# Patient Record
Sex: FEMALE | Race: WHITE | NOT HISPANIC OR LATINO
[De-identification: names, ages, dates, MRNs, and addresses within clinical notes are randomized per-mention and may not be internally consistent; named-entity substitution may affect disease eponyms.]

---

## 2015-02-13 RX ORDER — FAMOTIDINE 10 MG/ML
1 INJECTION INTRAVENOUS
Qty: 0 | Refills: 0 | COMMUNITY
Start: 2015-02-13

## 2015-02-13 RX ORDER — METOPROLOL TARTRATE 50 MG
25 TABLET ORAL
Qty: 0 | Refills: 0 | COMMUNITY
Start: 2015-02-13

## 2017-03-20 ENCOUNTER — APPOINTMENT (OUTPATIENT)
Dept: ELECTROPHYSIOLOGY | Facility: CLINIC | Age: 82
End: 2017-03-20

## 2017-10-02 ENCOUNTER — APPOINTMENT (OUTPATIENT)
Dept: ELECTROPHYSIOLOGY | Facility: CLINIC | Age: 82
End: 2017-10-02
Payer: MEDICARE

## 2017-10-02 PROCEDURE — 93296 REM INTERROG EVL PM/IDS: CPT

## 2017-10-02 PROCEDURE — 93295 DEV INTERROG REMOTE 1/2/MLT: CPT

## 2017-11-11 ENCOUNTER — INPATIENT (INPATIENT)
Facility: HOSPITAL | Age: 82
LOS: 3 days | Discharge: HOME | End: 2017-11-15
Attending: INTERNAL MEDICINE | Admitting: INTERNAL MEDICINE
Payer: MEDICARE

## 2017-11-11 VITALS
HEART RATE: 76 BPM | DIASTOLIC BLOOD PRESSURE: 72 MMHG | SYSTOLIC BLOOD PRESSURE: 132 MMHG | OXYGEN SATURATION: 100 % | TEMPERATURE: 98 F | RESPIRATION RATE: 16 BRPM

## 2017-11-11 DIAGNOSIS — E11.9 TYPE 2 DIABETES MELLITUS WITHOUT COMPLICATIONS: ICD-10-CM

## 2017-11-11 DIAGNOSIS — N18.9 CHRONIC KIDNEY DISEASE, UNSPECIFIED: ICD-10-CM

## 2017-11-11 DIAGNOSIS — I21.4 NON-ST ELEVATION (NSTEMI) MYOCARDIAL INFARCTION: ICD-10-CM

## 2017-11-11 DIAGNOSIS — I10 ESSENTIAL (PRIMARY) HYPERTENSION: ICD-10-CM

## 2017-11-11 DIAGNOSIS — Z29.9 ENCOUNTER FOR PROPHYLACTIC MEASURES, UNSPECIFIED: ICD-10-CM

## 2017-11-11 DIAGNOSIS — I50.9 HEART FAILURE, UNSPECIFIED: ICD-10-CM

## 2017-11-11 DIAGNOSIS — J45.909 UNSPECIFIED ASTHMA, UNCOMPLICATED: ICD-10-CM

## 2017-11-11 DIAGNOSIS — I48.91 UNSPECIFIED ATRIAL FIBRILLATION: ICD-10-CM

## 2017-11-11 LAB
ALBUMIN SERPL ELPH-MCNC: 3.4 G/DL — SIGNIFICANT CHANGE UP (ref 3.3–5)
ALP SERPL-CCNC: 51 U/L — SIGNIFICANT CHANGE UP (ref 40–120)
ALT FLD-CCNC: 19 U/L — SIGNIFICANT CHANGE UP (ref 4–33)
ANISOCYTOSIS BLD QL: SIGNIFICANT CHANGE UP
APTT BLD: 31.8 SEC — SIGNIFICANT CHANGE UP (ref 27.5–37.4)
AST SERPL-CCNC: 29 U/L — SIGNIFICANT CHANGE UP (ref 4–32)
BASOPHILS # BLD AUTO: 0.06 K/UL — SIGNIFICANT CHANGE UP (ref 0–0.2)
BASOPHILS NFR BLD AUTO: 0.6 % — SIGNIFICANT CHANGE UP (ref 0–2)
BASOPHILS NFR SPEC: 0.9 % — SIGNIFICANT CHANGE UP (ref 0–2)
BILIRUB SERPL-MCNC: 0.5 MG/DL — SIGNIFICANT CHANGE UP (ref 0.2–1.2)
BUN SERPL-MCNC: 20 MG/DL — SIGNIFICANT CHANGE UP (ref 7–23)
CALCIUM SERPL-MCNC: 8.7 MG/DL — SIGNIFICANT CHANGE UP (ref 8.4–10.5)
CHLORIDE SERPL-SCNC: 107 MMOL/L — SIGNIFICANT CHANGE UP (ref 98–107)
CK MB BLD-MCNC: 3.2 — HIGH (ref 0–2.5)
CK MB BLD-MCNC: 5.64 NG/ML — HIGH (ref 1–4.7)
CK MB BLD-MCNC: 6.17 NG/ML — HIGH (ref 1–4.7)
CK SERPL-CCNC: 166 U/L — SIGNIFICANT CHANGE UP (ref 25–170)
CK SERPL-CCNC: 193 U/L — HIGH (ref 25–170)
CO2 SERPL-SCNC: 24 MMOL/L — SIGNIFICANT CHANGE UP (ref 22–31)
CREAT SERPL-MCNC: 1.85 MG/DL — HIGH (ref 0.5–1.3)
EOSINOPHIL # BLD AUTO: 0.05 K/UL — SIGNIFICANT CHANGE UP (ref 0–0.5)
EOSINOPHIL NFR BLD AUTO: 0.5 % — SIGNIFICANT CHANGE UP (ref 0–6)
EOSINOPHIL NFR FLD: 0 % — SIGNIFICANT CHANGE UP (ref 0–6)
GIANT PLATELETS BLD QL SMEAR: PRESENT — SIGNIFICANT CHANGE UP
GLUCOSE BLDC GLUCOMTR-MCNC: 86 MG/DL — SIGNIFICANT CHANGE UP (ref 70–99)
GLUCOSE SERPL-MCNC: 112 MG/DL — HIGH (ref 70–99)
HCT VFR BLD CALC: 30.8 % — LOW (ref 34.5–45)
HGB BLD-MCNC: 8.7 G/DL — LOW (ref 11.5–15.5)
HYPOCHROMIA BLD QL: SLIGHT — SIGNIFICANT CHANGE UP
IMM GRANULOCYTES # BLD AUTO: 0.07 # — SIGNIFICANT CHANGE UP
IMM GRANULOCYTES NFR BLD AUTO: 0.7 % — SIGNIFICANT CHANGE UP (ref 0–1.5)
INR BLD: 3.14 — HIGH (ref 0.88–1.17)
LYMPHOCYTES # BLD AUTO: 1.46 K/UL — SIGNIFICANT CHANGE UP (ref 1–3.3)
LYMPHOCYTES # BLD AUTO: 14.2 % — SIGNIFICANT CHANGE UP (ref 13–44)
LYMPHOCYTES NFR SPEC AUTO: 7.1 % — LOW (ref 13–44)
MCHC RBC-ENTMCNC: 22.2 PG — LOW (ref 27–34)
MCHC RBC-ENTMCNC: 28.2 % — LOW (ref 32–36)
MCV RBC AUTO: 78.6 FL — LOW (ref 80–100)
MICROCYTES BLD QL: SLIGHT — SIGNIFICANT CHANGE UP
MONOCYTES # BLD AUTO: 0.87 K/UL — SIGNIFICANT CHANGE UP (ref 0–0.9)
MONOCYTES NFR BLD AUTO: 8.5 % — SIGNIFICANT CHANGE UP (ref 2–14)
MONOCYTES NFR BLD: 2.6 % — SIGNIFICANT CHANGE UP (ref 2–9)
NEUTROPHIL AB SER-ACNC: 84.1 % — HIGH (ref 43–77)
NEUTROPHILS # BLD AUTO: 7.75 K/UL — HIGH (ref 1.8–7.4)
NEUTROPHILS NFR BLD AUTO: 75.5 % — SIGNIFICANT CHANGE UP (ref 43–77)
NEUTS BAND # BLD: 2.6 % — SIGNIFICANT CHANGE UP (ref 0–6)
NRBC # FLD: 0 — SIGNIFICANT CHANGE UP
OVALOCYTES BLD QL SMEAR: SLIGHT — SIGNIFICANT CHANGE UP
PLATELET # BLD AUTO: 197 K/UL — SIGNIFICANT CHANGE UP (ref 150–400)
PLATELET COUNT - ESTIMATE: NORMAL — SIGNIFICANT CHANGE UP
PMV BLD: SIGNIFICANT CHANGE UP FL (ref 7–13)
POIKILOCYTOSIS BLD QL AUTO: SLIGHT — SIGNIFICANT CHANGE UP
POTASSIUM SERPL-MCNC: 4.7 MMOL/L — SIGNIFICANT CHANGE UP (ref 3.5–5.3)
POTASSIUM SERPL-SCNC: 4.7 MMOL/L — SIGNIFICANT CHANGE UP (ref 3.5–5.3)
PROT SERPL-MCNC: 6.2 G/DL — SIGNIFICANT CHANGE UP (ref 6–8.3)
PROTHROM AB SERPL-ACNC: 36 SEC — HIGH (ref 9.8–13.1)
RBC # BLD: 3.92 M/UL — SIGNIFICANT CHANGE UP (ref 3.8–5.2)
RBC # FLD: 21.7 % — HIGH (ref 10.3–14.5)
SODIUM SERPL-SCNC: 142 MMOL/L — SIGNIFICANT CHANGE UP (ref 135–145)
TARGETS BLD QL SMEAR: SLIGHT — SIGNIFICANT CHANGE UP
TROPONIN T SERPL-MCNC: 0.5 NG/ML — HIGH (ref 0–0.06)
TROPONIN T SERPL-MCNC: 0.55 NG/ML — HIGH (ref 0–0.06)
VARIANT LYMPHS # BLD: 2.7 % — SIGNIFICANT CHANGE UP
WBC # BLD: 10.26 K/UL — SIGNIFICANT CHANGE UP (ref 3.8–10.5)
WBC # FLD AUTO: 10.26 K/UL — SIGNIFICANT CHANGE UP (ref 3.8–10.5)

## 2017-11-11 PROCEDURE — 71010: CPT | Mod: 26

## 2017-11-11 PROCEDURE — 74174 CTA ABD&PLVS W/CONTRAST: CPT | Mod: 26

## 2017-11-11 PROCEDURE — 71275 CT ANGIOGRAPHY CHEST: CPT | Mod: 26

## 2017-11-11 RX ORDER — INSULIN LISPRO 100/ML
VIAL (ML) SUBCUTANEOUS AT BEDTIME
Qty: 0 | Refills: 0 | Status: DISCONTINUED | OUTPATIENT
Start: 2017-11-11 | End: 2017-11-15

## 2017-11-11 RX ORDER — ASPIRIN/CALCIUM CARB/MAGNESIUM 324 MG
1 TABLET ORAL
Qty: 0 | Refills: 0 | COMMUNITY

## 2017-11-11 RX ORDER — MORPHINE SULFATE 50 MG/1
4 CAPSULE, EXTENDED RELEASE ORAL ONCE
Qty: 0 | Refills: 0 | Status: DISCONTINUED | OUTPATIENT
Start: 2017-11-11 | End: 2017-11-11

## 2017-11-11 RX ORDER — GLUCAGON INJECTION, SOLUTION 0.5 MG/.1ML
1 INJECTION, SOLUTION SUBCUTANEOUS ONCE
Qty: 0 | Refills: 0 | Status: DISCONTINUED | OUTPATIENT
Start: 2017-11-11 | End: 2017-11-15

## 2017-11-11 RX ORDER — BRIMONIDINE TARTRATE 2 MG/MG
1 SOLUTION/ DROPS OPHTHALMIC DAILY
Qty: 0 | Refills: 0 | Status: DISCONTINUED | OUTPATIENT
Start: 2017-11-11 | End: 2017-11-15

## 2017-11-11 RX ORDER — DEXTROSE 50 % IN WATER 50 %
12.5 SYRINGE (ML) INTRAVENOUS ONCE
Qty: 0 | Refills: 0 | Status: DISCONTINUED | OUTPATIENT
Start: 2017-11-11 | End: 2017-11-15

## 2017-11-11 RX ORDER — DEXTROSE 50 % IN WATER 50 %
1 SYRINGE (ML) INTRAVENOUS ONCE
Qty: 0 | Refills: 0 | Status: DISCONTINUED | OUTPATIENT
Start: 2017-11-11 | End: 2017-11-15

## 2017-11-11 RX ORDER — DEXTROSE 50 % IN WATER 50 %
25 SYRINGE (ML) INTRAVENOUS ONCE
Qty: 0 | Refills: 0 | Status: DISCONTINUED | OUTPATIENT
Start: 2017-11-11 | End: 2017-11-15

## 2017-11-11 RX ORDER — ALBUTEROL 90 UG/1
1 AEROSOL, METERED ORAL
Qty: 0 | Refills: 0 | Status: DISCONTINUED | OUTPATIENT
Start: 2017-11-11 | End: 2017-11-15

## 2017-11-11 RX ORDER — INSULIN LISPRO 100/ML
VIAL (ML) SUBCUTANEOUS
Qty: 0 | Refills: 0 | Status: DISCONTINUED | OUTPATIENT
Start: 2017-11-11 | End: 2017-11-15

## 2017-11-11 RX ORDER — ASPIRIN/CALCIUM CARB/MAGNESIUM 324 MG
81 TABLET ORAL DAILY
Qty: 0 | Refills: 0 | Status: DISCONTINUED | OUTPATIENT
Start: 2017-11-11 | End: 2017-11-15

## 2017-11-11 RX ORDER — ASPIRIN/CALCIUM CARB/MAGNESIUM 324 MG
162 TABLET ORAL ONCE
Qty: 0 | Refills: 0 | Status: COMPLETED | OUTPATIENT
Start: 2017-11-11 | End: 2017-11-11

## 2017-11-11 RX ORDER — NITROGLYCERIN 6.5 MG
0.4 CAPSULE, EXTENDED RELEASE ORAL ONCE
Qty: 0 | Refills: 0 | Status: DISCONTINUED | OUTPATIENT
Start: 2017-11-11 | End: 2017-11-11

## 2017-11-11 RX ORDER — HEPARIN SODIUM 5000 [USP'U]/ML
INJECTION INTRAVENOUS; SUBCUTANEOUS
Qty: 25000 | Refills: 0 | Status: DISCONTINUED | OUTPATIENT
Start: 2017-11-11 | End: 2017-11-11

## 2017-11-11 RX ORDER — SODIUM CHLORIDE 9 MG/ML
1000 INJECTION, SOLUTION INTRAVENOUS
Qty: 0 | Refills: 0 | Status: DISCONTINUED | OUTPATIENT
Start: 2017-11-11 | End: 2017-11-15

## 2017-11-11 RX ORDER — FLUTICASONE PROPIONATE 220 MCG
1 AEROSOL WITH ADAPTER (GRAM) INHALATION
Qty: 0 | Refills: 0 | Status: DISCONTINUED | OUTPATIENT
Start: 2017-11-11 | End: 2017-11-15

## 2017-11-11 RX ORDER — HEPARIN SODIUM 5000 [USP'U]/ML
3500 INJECTION INTRAVENOUS; SUBCUTANEOUS EVERY 6 HOURS
Qty: 0 | Refills: 0 | Status: DISCONTINUED | OUTPATIENT
Start: 2017-11-11 | End: 2017-11-11

## 2017-11-11 RX ORDER — FAMOTIDINE 10 MG/ML
20 INJECTION INTRAVENOUS DAILY
Qty: 0 | Refills: 0 | Status: DISCONTINUED | OUTPATIENT
Start: 2017-11-11 | End: 2017-11-15

## 2017-11-11 RX ORDER — ROSUVASTATIN CALCIUM 5 MG/1
40 TABLET ORAL AT BEDTIME
Qty: 0 | Refills: 0 | Status: DISCONTINUED | OUTPATIENT
Start: 2017-11-11 | End: 2017-11-15

## 2017-11-11 RX ORDER — NITROGLYCERIN 6.5 MG
0.4 CAPSULE, EXTENDED RELEASE ORAL ONCE
Qty: 0 | Refills: 0 | Status: COMPLETED | OUTPATIENT
Start: 2017-11-11 | End: 2017-11-11

## 2017-11-11 RX ORDER — BRIMONIDINE TARTRATE 2 MG/MG
1 SOLUTION/ DROPS OPHTHALMIC
Qty: 0 | Refills: 0 | COMMUNITY

## 2017-11-11 RX ORDER — INFLUENZA VIRUS VACCINE 15; 15; 15; 15 UG/.5ML; UG/.5ML; UG/.5ML; UG/.5ML
0.5 SUSPENSION INTRAMUSCULAR ONCE
Qty: 0 | Refills: 0 | Status: COMPLETED | OUTPATIENT
Start: 2017-11-11 | End: 2017-11-15

## 2017-11-11 RX ORDER — FLUTICASONE PROPIONATE 220 MCG
2 AEROSOL WITH ADAPTER (GRAM) INHALATION
Qty: 0 | Refills: 0 | COMMUNITY

## 2017-11-11 RX ORDER — HEPARIN SODIUM 5000 [USP'U]/ML
3500 INJECTION INTRAVENOUS; SUBCUTANEOUS ONCE
Qty: 0 | Refills: 0 | Status: COMPLETED | OUTPATIENT
Start: 2017-11-11 | End: 2017-11-11

## 2017-11-11 RX ORDER — FUROSEMIDE 40 MG
1 TABLET ORAL
Qty: 0 | Refills: 0 | COMMUNITY

## 2017-11-11 RX ORDER — LATANOPROST 0.05 MG/ML
1 SOLUTION/ DROPS OPHTHALMIC; TOPICAL AT BEDTIME
Qty: 0 | Refills: 0 | Status: DISCONTINUED | OUTPATIENT
Start: 2017-11-11 | End: 2017-11-15

## 2017-11-11 RX ORDER — METOPROLOL TARTRATE 50 MG
25 TABLET ORAL DAILY
Qty: 0 | Refills: 0 | Status: DISCONTINUED | OUTPATIENT
Start: 2017-11-11 | End: 2017-11-15

## 2017-11-11 RX ORDER — FUROSEMIDE 40 MG
20 TABLET ORAL DAILY
Qty: 0 | Refills: 0 | Status: DISCONTINUED | OUTPATIENT
Start: 2017-11-11 | End: 2017-11-13

## 2017-11-11 RX ORDER — MEXILETINE HYDROCHLORIDE 150 MG/1
150 CAPSULE ORAL
Qty: 0 | Refills: 0 | Status: DISCONTINUED | OUTPATIENT
Start: 2017-11-11 | End: 2017-11-15

## 2017-11-11 RX ADMIN — LATANOPROST 1 DROP(S): 0.05 SOLUTION/ DROPS OPHTHALMIC; TOPICAL at 23:24

## 2017-11-11 RX ADMIN — Medication 162 MILLIGRAM(S): at 18:24

## 2017-11-11 RX ADMIN — MORPHINE SULFATE 4 MILLIGRAM(S): 50 CAPSULE, EXTENDED RELEASE ORAL at 16:05

## 2017-11-11 RX ADMIN — HEPARIN SODIUM 3500 UNIT(S): 5000 INJECTION INTRAVENOUS; SUBCUTANEOUS at 18:32

## 2017-11-11 RX ADMIN — Medication 1 PUFF(S): at 22:34

## 2017-11-11 RX ADMIN — Medication 0.4 MILLIGRAM(S): at 20:14

## 2017-11-11 RX ADMIN — MORPHINE SULFATE 4 MILLIGRAM(S): 50 CAPSULE, EXTENDED RELEASE ORAL at 18:23

## 2017-11-11 RX ADMIN — HEPARIN SODIUM 700 UNIT(S)/HR: 5000 INJECTION INTRAVENOUS; SUBCUTANEOUS at 18:36

## 2017-11-11 RX ADMIN — ROSUVASTATIN CALCIUM 40 MILLIGRAM(S): 5 TABLET ORAL at 23:24

## 2017-11-11 NOTE — H&P ADULT - ASSESSMENT
85 y/o F with h/o a. fib on coumadin, anemia, asthma, bilateral cataracts, chronic glaucoma, PUD, CHF (EF 25%) s/p AICD, CAD, DM, DVT, HLD HTN, MI presents to the ED for chest pain. Admit to telemetry.

## 2017-11-11 NOTE — CONSULT NOTE ADULT - SUBJECTIVE AND OBJECTIVE BOX
HISTORY OF PRESENT ILLNESS:  Patient is a 86y old  Female who presents with a chief complaint of   HPI:      Allergies    adenosine (Other)  amiodarone (Unknown)  atenolol (Unknown)  digitalis glycosides (Nausea; Vomiting)  Lipitor (Other)    Intolerances    	    MEDICATIONS:  aspirin  chewable 81 milliGRAM(s) Oral daily  furosemide    Tablet 20 milliGRAM(s) Oral daily  metoprolol succinate ER 25 milliGRAM(s) Oral daily  mexiletine 150 milliGRAM(s) Oral two times a day      ALBUTerol    90 MICROgram(s) HFA Inhaler 1 Puff(s) Inhalation two times a day PRN  fluticasone propionate   110 MICROgram(s) HFA Inhaler 1 Puff(s) Inhalation two times a day      famotidine    Tablet 20 milliGRAM(s) Oral daily    rosuvastatin 40 milliGRAM(s) Oral at bedtime    brimonidine 0.2% Ophthalmic Solution 1 Drop(s) Both EYES daily  latanoprost 0.005% Ophthalmic Solution 1 Drop(s) Both EYES at bedtime      PAST MEDICAL & SURGICAL HISTORY:  Diabetes mellitus  Anemia  Asthma  Old MI (myocardial infarction): IN 1999  ICD: [employer]  DVT (Deep Venous Thrombosis) (ICD9 453.40)  AF (Atrial Fibrillation) (ICD9 427.31): on coumadin  Hyperlipemia (ICD9 272.4)  Chronic Glaucoma (ICD9 365.11)  Congestive Heart Failure (ICD9 428.0)  Bilateral Cataracts (ICD9 366.9)  Chronic Peptic Ulcer without Complication (ICD9 533.70)  Hypertension (ICD9 401.9)  Ventricular Tachycardia (ICD9 427.1)  Osteoporosis (ICD9 733.00)  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)  Coronary Artery Disease (ICD9 414.00)  H/O: hysterectomy  ICD: Medtronic 1999, 2006, 2009  S/P Ablation Operation for Arrhythmia (ICD9 V45.89): Vt ablation  s/p Ablation of Ventricular Arrhythmia (ICD9 V45.89): AVNRT ablation        FAMILY HISTORY:  No significant family history      SOCIAL HISTORY:      Smoker: [ ] Active [ ] never  [ ] previous  Alcohol:  [ ] social [ ] daily [ ] never  Lives with:   Occupation:    REVIEW OF SYSTEMS:  CONSTITUTIONAL: No weakness, fevers or chills  EYES/ENT: No visual changes;  No vertigo or throat pain   NECK: No pain or stiffness  RESPIRATORY: No cough, wheezing, hemoptysis; No shortness of breath  CARDIOVASCULAR: No chest pain or palpitations  GASTROINTESTINAL: No abdominal or epigastric pain. No nausea, vomiting, or hematemesis; No diarrhea or constipation. No melena or hematochezia.  GENITOURINARY: No dysuria, frequency or hematuria  NEUROLOGICAL: No numbness or weakness  SKIN: No itching, burning, rashes, or lesions   All other review of systems is negative unless indicated above.    PHYSICAL EXAM:  T(C): 36.9 (11-11-17 @ 14:51), Max: 36.9 (11-11-17 @ 14:51)  HR: 44 (11-11-17 @ 20:46) (44 - 76)  BP: 121/62 (11-11-17 @ 20:46) (116/62 - 136/63)  RR: 16 (11-11-17 @ 20:46) (16 - 19)  SpO2: 98% (11-11-17 @ 20:46) (98% - 100%)  Wt(kg): --    Appearance: Normal	  HEENT:   Normal oral mucosa, PERRL, EOMI	  Lymphatic: No lymphadenopathy  Cardiovascular: Normal S1 S2, No JVD, No murmurs, No edema  Respiratory: Lungs clear to auscultation	  Psychiatry: A & O x 3, Mood & affect appropriate  Gastrointestinal:  Soft, Non-tender, + BS	  Skin: No rashes, No ecchymoses, No cyanosis	  Neurologic: Non-focal, A&Ox3, nonfocal, MEMBRENO x 4  Extremities: Normal range of motion, No clubbing, cyanosis or edema  Vascular: Peripheral pulses palpable 2+ bilaterally    I&O's Summary    	 	  LABS:	 	                          8.7    10.26 )-----------( 197      ( 11 Nov 2017 15:23 )             30.8     11-11    142  |  107  |  20  ----------------------------<  112<H>  4.7   |  24  |  1.85<H>    Ca    8.7      11 Nov 2017 15:23    TPro  6.2  /  Alb  3.4  /  TBili  0.5  /  DBili  x   /  AST  29  /  ALT  19  /  AlkPhos  51  11-11    LIVER FUNCTIONS - ( 11 Nov 2017 15:23 )  Alb: 3.4 g/dL / Pro: 6.2 g/dL / ALK PHOS: 51 u/L / ALT: 19 u/L / AST: 29 u/L / GGT: x             proBNP:   Lipid Profile:   HgA1c:   TSH:   PT/INR - ( 11 Nov 2017 15:23 )   PT: 36.0 SEC;   INR: 3.14          PTT - ( 11 Nov 2017 15:23 )  PTT:31.8 SEC  CARDIAC MARKERS:  Troponin T, Serum: 0.55 ng/mL (11-11 @ 15:23)    Creatine Kinase, Serum: 193 u/L (11-11 @ 15:23)    CKMB: 6.17 ng/mL (11-11 @ 15:23)    CKMB Relative Index: 3.2 (11-11 @ 15:23)    TELEMETRY: 	    ECG:  	  RADIOLOGY:  OTHER: 	    PREVIOUS DIAGNOSTIC TESTING:    [ ] Echocardiogram:  [ ]  Catheterization:  [ ] Stress Test:  	  	    Assessment    Problem/Plan HISTORY OF PRESENT ILLNESS:  Patient is a 86y old  Female who presents with a chief complaint of CP  HPI: 87yo Female PMH of A-fib on coumadin, anemia, asthma, CAD, MI, HFrEF 25%, s/p BiVICD, CKD, DM, DVT, HLD, HTN, bilateral cataracts,  chronic glaucoma, PUD  p/w chest pain sudden onset this AM, sharp, midsternal, radiating to back. Reports CP began at rest. Not associated with SOB nausea, vomiting or diaphoresis. In ED EKG V-paced without ST elevations or depressions. CKMB index 3.2 and Troponin 0.55. Given SL nitro with decrease in CP. Called for urgent cath consult by Pt's cardiologist Dr Mac. Denies palpitations, syncope, near syncope, numbness, tingling, or weakness.    Allergies    adenosine (Other)  amiodarone (Unknown)  atenolol (Unknown)  digitalis glycosides (Nausea; Vomiting)  Lipitor (Other)    MEDICATIONS:  aspirin  chewable 81 milliGRAM(s) Oral daily  furosemide    Tablet 20 milliGRAM(s) Oral daily  metoprolol succinate ER 25 milliGRAM(s) Oral daily  mexiletine 150 milliGRAM(s) Oral two times a day  ALBUTerol    90 MICROgram(s) HFA Inhaler 1 Puff(s) Inhalation two times a day PRN  fluticasone propionate   110 MICROgram(s) HFA Inhaler 1 Puff(s) Inhalation two times a day  famotidine    Tablet 20 milliGRAM(s) Oral daily  rosuvastatin 40 milliGRAM(s) Oral at bedtime  brimonidine 0.2% Ophthalmic Solution 1 Drop(s) Both EYES daily  latanoprost 0.005% Ophthalmic Solution 1 Drop(s) Both EYES at bedtime      PAST MEDICAL & SURGICAL HISTORY:  Diabetes mellitus  Anemia  Asthma  Old MI (myocardial infarction): IN 1999  ICD: [employer]  DVT (Deep Venous Thrombosis) (ICD9 453.40)  AF (Atrial Fibrillation) (ICD9 427.31): on coumadin  Hyperlipemia (ICD9 272.4)  Chronic Glaucoma (ICD9 365.11)  Congestive Heart Failure (ICD9 428.0)  Bilateral Cataracts (ICD9 366.9)  Chronic Peptic Ulcer without Complication (ICD9 533.70)  Hypertension (ICD9 401.9)  Ventricular Tachycardia (ICD9 427.1)  Osteoporosis (ICD9 733.00)  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)  Coronary Artery Disease (ICD9 414.00)  H/O: hysterectomy  ICD: Medtronic 1999, 2006, 2009  S/P Ablation Operation for Arrhythmia (ICD9 V45.89): Vt ablation  s/p Ablation of Ventricular Arrhythmia (ICD9 V45.89): AVNRT ablation    FAMILY HISTORY:  No significant family history    SOCIAL HISTORY:      Smoker: [ ] Active [x] never  [ ] previous  Alcohol:  [ ] social [x ] daily [ ] never    REVIEW OF SYSTEMS:  CONSTITUTIONAL: No weakness, fevers or chills  EYES/ENT: No visual changes;  No vertigo or throat pain   NECK: No pain or stiffness  RESPIRATORY: No cough, wheezing, hemoptysis; No shortness of breath  CARDIOVASCULAR: Chest pain as per HPI, No palpitations  GASTROINTESTINAL: No abdominal or epigastric pain. No nausea, vomiting, or hematemesis; No diarrhea or constipation. No melena or hematochezia.  GENITOURINARY: No dysuria, frequency or hematuria  NEUROLOGICAL: No numbness or weakness  SKIN: No itching, burning, rashes, or lesions   All other review of systems is negative unless indicated above.    PHYSICAL EXAM:  T(C): 36.9 (11-11-17 @ 14:51), Max: 36.9 (11-11-17 @ 14:51)  HR: 44 (11-11-17 @ 20:46) (44 - 76)  BP: 121/62 (11-11-17 @ 20:46) (116/62 - 136/63)  RR: 16 (11-11-17 @ 20:46) (16 - 19)  SpO2: 98% (11-11-17 @ 20:46) (98% - 100%)  Wt(kg): --    Appearance: Normal	  HEENT:   Normal oral mucosa, PERRL, EOMI	  Lymphatic: No lymphadenopathy  Cardiovascular: Normal S1 S2, No JVD, No murmurs, No edema  Respiratory: Lungs clear to auscultation	  Psychiatry: A & O x 3, Mood & affect appropriate  Gastrointestinal:  Soft, Non-tender, + BS	  Skin: No rashes, No ecchymoses, No cyanosis	  Neurologic: Non-focal, A&Ox3, nonfocal, MEMBRENO x 4  Extremities: Normal range of motion, No clubbing, cyanosis or edema  Vascular: Peripheral pulses palpable 2+ bilaterally    I&O's Summary    	 	  LABS:	 	                          8.7    10.26 )-----------( 197      ( 11 Nov 2017 15:23 )             30.8     11-11    142  |  107  |  20  ----------------------------<  112<H>  4.7   |  24  |  1.85<H>    Ca    8.7      11 Nov 2017 15:23    TPro  6.2  /  Alb  3.4  /  TBili  0.5  /  DBili  x   /  AST  29  /  ALT  19  /  AlkPhos  51  11-11    LIVER FUNCTIONS - ( 11 Nov 2017 15:23 )  Alb: 3.4 g/dL / Pro: 6.2 g/dL / ALK PHOS: 51 u/L / ALT: 19 u/L / AST: 29 u/L / GGT: x             proBNP:   Lipid Profile:   HgA1c:   TSH:   PT/INR - ( 11 Nov 2017 15:23 )   PT: 36.0 SEC;   INR: 3.14          PTT - ( 11 Nov 2017 15:23 )  PTT:31.8 SEC  CARDIAC MARKERS:  Troponin T, Serum: 0.55 ng/mL (11-11 @ 15:23)    Creatine Kinase, Serum: 193 u/L (11-11 @ 15:23)    CKMB: 6.17 ng/mL (11-11 @ 15:23)    CKMB Relative Index: 3.2 (11-11 @ 15:23)    	    ECG: V paced	  RADIOLOGY: < from: CT Angio Chest w/ IV Cont (11.11.17 @ 17:40) >  IMPRESSION:     No aortic dissection, aortic aneurysm, or intramural hematoma.    Reflux of contrast into the suprahepatic IVC and hepatic veins may   reflect right heart failure.    Age-indeterminate T9 and T10 compression fractures are likely   acute/subacute, new since previous CT thoracic spine of 12/29/2015.   Multiple additional chronic compression fractures throughout the thoracic   spine the setting of osteopenia, unchanged.    Moderate hiatal hernia unchanged.      < end of copied text >    	    PREVIOUS DIAGNOSTIC TESTING:    [ ] Echocardiogram: V< from: Transesophageal Echocardiogram (02.12.15 @ 12:27) >  CONCLUSIONS:  1. Mitral annular calcification. Tethered mitral valve  leaflets with normal opening. Mild mitral regurgitation.  2. Calcified trileaflet aortic valve with normal opening.  Mild aortic regurgitation.  3. Severe left atrial enlargement. Decreased MIRTHA function  (velocity 30cm/s). No left atrial or left atrial appendage  thrombus.  4. Severe segmental left ventricular systolic dysfunction.  EF 20-25%. The inferior, infero-lateral and mid to distal  brittnee-lateral walls are akinetic. The rest of the walls  are hypokinetic.  5. Normal right ventricular size and function. A device  wire is noted in the right heart.  6. Estimated right ventricular systolic pressure equals 32  mm Hg, assuming right atrial pressure equals 10 mm Hg,  consistent with normal pulmonary pressures.  7. Normal pericardium with no pericardial effusion.  ------------------------------------------------------------------------    < end of copied text >    [ ]  Catheterization:< from: Cardiac Cath Lab (12.03.13 @ 13:40) >  VENTRICLES: Analysis of regional contractile function demonstrated mild  anterobasal hypokinesis, severe anterolateral hypokinesis, apical  akinesis, diaphragmatic akinesis, and posterobasal akinesis. EF estimated  was 15 %.  CORONARY VESSELS: The coronary circulation is right dominant.  LM:   --  LM: Angiography showed minor luminal irregularities with no flow  limiting lesions.  --  Ostial LM: The vessel was calcified. Angiography showed minor luminal  irregularities with no flow limiting lesions.  LAD:   --  LAD: Angiography showed minor luminal irregularities with no  flow limiting lesions.  CX:   --  Circumflex: Angiography showed minor luminal irregularities with  no flow limiting lesions.  RCA:   --  Ostial RCA: The distal vessel was supplied by extensive  collaterals from the distal LAD. There was a 100 % stenosis.  COMPLICATIONS: There were no complications.  DIAGNOSTIC IMPRESSIONS: Patient has a dilated LV with severe LV dysfunction  with total occlusion of RCA with mature collaterals with calcification of  aorta with nonobstrictive disease of the left system  DIAGNOSTIC RECOMMENDATIONS: Medical therapy for LV dysfunction.    < end of copied text >    Assessment  87yo Female PMH of A-fib on coumadin, anemia, asthma, CAD, MI, HFrEF 25%, s/p BiVICD, CKD, DM, DVT, HLD, HTN, bilateral cataracts,  chronic glaucoma, PUD  p/w chest pain sudden onset this AM, sharp, midsternal, radiating to back. Now with elevated troponins in the setting of CKD. EKG  V-paced without ST elevations or depressions. CP responding to SL nitro. INR supra therapeutic 3.14       Problem/Plan: NSTEMI  JEREMI score: 5  Patient with elevated CE in the setting of CKD and chest pain without EKG changes now responding to nitroglycerin.   Pt currently with supra therapeutic INR and at risk for bleeding. No need for urgent cath at this time. Will reevaluate as necessary. HISTORY OF PRESENT ILLNESS:  Patient is a 86y old  Female who presents with a chief complaint of CP  HPI: 87yo Female PMH of A-fib on coumadin, anemia, asthma, CAD, MI, HFrEF 25%, s/p BiVICD, CKD, DM, DVT, HLD, HTN, bilateral cataracts,  chronic glaucoma, PUD  p/w chest pain sudden onset this AM, sharp, midsternal, radiating to back. Reports CP began at rest. Not associated with SOB nausea, vomiting or diaphoresis. In ED EKG V-paced unchanged from previous EKG in July. CKMB index 3.2 and Troponin 0.55. Given SL nitro with reported improvement in CP. Called for urgent cath consult by Pt's cardiologist Dr Mac. Denies palpitations, syncope, near syncope, numbness, tingling, or weakness.    Allergies    adenosine (Other)  amiodarone (Unknown)  atenolol (Unknown)  digitalis glycosides (Nausea; Vomiting)  Lipitor (Other)    MEDICATIONS:  aspirin  chewable 81 milliGRAM(s) Oral daily  furosemide    Tablet 20 milliGRAM(s) Oral daily  metoprolol succinate ER 25 milliGRAM(s) Oral daily  mexiletine 150 milliGRAM(s) Oral two times a day  ALBUTerol    90 MICROgram(s) HFA Inhaler 1 Puff(s) Inhalation two times a day PRN  fluticasone propionate   110 MICROgram(s) HFA Inhaler 1 Puff(s) Inhalation two times a day  famotidine    Tablet 20 milliGRAM(s) Oral daily  rosuvastatin 40 milliGRAM(s) Oral at bedtime  brimonidine 0.2% Ophthalmic Solution 1 Drop(s) Both EYES daily  latanoprost 0.005% Ophthalmic Solution 1 Drop(s) Both EYES at bedtime      PAST MEDICAL & SURGICAL HISTORY:  Diabetes mellitus  Anemia  Asthma  Old MI (myocardial infarction): IN 1999  ICD: [employer]  DVT (Deep Venous Thrombosis) (ICD9 453.40)  AF (Atrial Fibrillation) (ICD9 427.31): on coumadin  Hyperlipemia (ICD9 272.4)  Chronic Glaucoma (ICD9 365.11)  Congestive Heart Failure (ICD9 428.0)  Bilateral Cataracts (ICD9 366.9)  Chronic Peptic Ulcer without Complication (ICD9 533.70)  Hypertension (ICD9 401.9)  Ventricular Tachycardia (ICD9 427.1)  Osteoporosis (ICD9 733.00)  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)  Coronary Artery Disease (ICD9 414.00)  H/O: hysterectomy  ICD: Medtronic 1999, 2006, 2009  S/P Ablation Operation for Arrhythmia (ICD9 V45.89): Vt ablation  s/p Ablation of Ventricular Arrhythmia (ICD9 V45.89): AVNRT ablation    FAMILY HISTORY:  No significant family history    SOCIAL HISTORY:      Smoker: [ ] Active [x] never  [ ] previous  Alcohol:  [ ] social [x ] daily [ ] never    REVIEW OF SYSTEMS:  CONSTITUTIONAL: No weakness, fevers or chills  EYES/ENT: No visual changes;  No vertigo or throat pain   NECK: No pain or stiffness  RESPIRATORY: No cough, wheezing, hemoptysis; No shortness of breath  CARDIOVASCULAR: Chest pain as per HPI, No palpitations  GASTROINTESTINAL: No abdominal or epigastric pain. No nausea, vomiting, or hematemesis; No diarrhea or constipation. No melena or hematochezia.  GENITOURINARY: No dysuria, frequency or hematuria  NEUROLOGICAL: No numbness or weakness  SKIN: No itching, burning, rashes, or lesions   All other review of systems is negative unless indicated above.    PHYSICAL EXAM:  T(C): 36.9 (11-11-17 @ 14:51), Max: 36.9 (11-11-17 @ 14:51)  HR: 44 (11-11-17 @ 20:46) (44 - 76)  BP: 121/62 (11-11-17 @ 20:46) (116/62 - 136/63)  RR: 16 (11-11-17 @ 20:46) (16 - 19)  SpO2: 98% (11-11-17 @ 20:46) (98% - 100%)  Wt(kg): --    Appearance: Normal	  HEENT:   Normal oral mucosa, PERRL, EOMI	  Lymphatic: No lymphadenopathy  Cardiovascular: Normal S1 S2, No JVD, No murmurs, No edema  Respiratory: Lungs clear to auscultation	  Psychiatry: A & O x 3, Mood & affect appropriate  Gastrointestinal:  Soft, Non-tender, + BS	  Skin: No rashes, No ecchymoses, No cyanosis	  Neurologic: Non-focal, A&Ox3, nonfocal, MEMBRENO x 4  Extremities: Normal range of motion, No clubbing, cyanosis or edema  Vascular: Peripheral pulses palpable 2+ bilaterally    I&O's Summary    	 	  LABS:	 	                          8.7    10.26 )-----------( 197      ( 11 Nov 2017 15:23 )             30.8     11-11    142  |  107  |  20  ----------------------------<  112<H>  4.7   |  24  |  1.85<H>    Ca    8.7      11 Nov 2017 15:23    TPro  6.2  /  Alb  3.4  /  TBili  0.5  /  DBili  x   /  AST  29  /  ALT  19  /  AlkPhos  51  11-11    LIVER FUNCTIONS - ( 11 Nov 2017 15:23 )  Alb: 3.4 g/dL / Pro: 6.2 g/dL / ALK PHOS: 51 u/L / ALT: 19 u/L / AST: 29 u/L / GGT: x             proBNP:   Lipid Profile:   HgA1c:   TSH:   PT/INR - ( 11 Nov 2017 15:23 )   PT: 36.0 SEC;   INR: 3.14          PTT - ( 11 Nov 2017 15:23 )  PTT:31.8 SEC  CARDIAC MARKERS:  Troponin T, Serum: 0.55 ng/mL (11-11 @ 15:23)    Creatine Kinase, Serum: 193 u/L (11-11 @ 15:23)    CKMB: 6.17 ng/mL (11-11 @ 15:23)    CKMB Relative Index: 3.2 (11-11 @ 15:23)    	    ECG: V paced	  RADIOLOGY: < from: CT Angio Chest w/ IV Cont (11.11.17 @ 17:40) >  IMPRESSION:     No aortic dissection, aortic aneurysm, or intramural hematoma.    Reflux of contrast into the suprahepatic IVC and hepatic veins may   reflect right heart failure.    Age-indeterminate T9 and T10 compression fractures are likely   acute/subacute, new since previous CT thoracic spine of 12/29/2015.   Multiple additional chronic compression fractures throughout the thoracic   spine the setting of osteopenia, unchanged.    Moderate hiatal hernia unchanged.      < end of copied text >    	    PREVIOUS DIAGNOSTIC TESTING:    [ ] Echocardiogram: V< from: Transesophageal Echocardiogram (02.12.15 @ 12:27) >  CONCLUSIONS:  1. Mitral annular calcification. Tethered mitral valve  leaflets with normal opening. Mild mitral regurgitation.  2. Calcified trileaflet aortic valve with normal opening.  Mild aortic regurgitation.  3. Severe left atrial enlargement. Decreased MIRTHA function  (velocity 30cm/s). No left atrial or left atrial appendage  thrombus.  4. Severe segmental left ventricular systolic dysfunction.  EF 20-25%. The inferior, infero-lateral and mid to distal  brittnee-lateral walls are akinetic. The rest of the walls  are hypokinetic.  5. Normal right ventricular size and function. A device  wire is noted in the right heart.  6. Estimated right ventricular systolic pressure equals 32  mm Hg, assuming right atrial pressure equals 10 mm Hg,  consistent with normal pulmonary pressures.  7. Normal pericardium with no pericardial effusion.  ------------------------------------------------------------------------    < end of copied text >    [ ]  Catheterization:< from: Cardiac Cath Lab (12.03.13 @ 13:40) >  VENTRICLES: Analysis of regional contractile function demonstrated mild  anterobasal hypokinesis, severe anterolateral hypokinesis, apical  akinesis, diaphragmatic akinesis, and posterobasal akinesis. EF estimated  was 15 %.  CORONARY VESSELS: The coronary circulation is right dominant.  LM:   --  LM: Angiography showed minor luminal irregularities with no flow  limiting lesions.  --  Ostial LM: The vessel was calcified. Angiography showed minor luminal  irregularities with no flow limiting lesions.  LAD:   --  LAD: Angiography showed minor luminal irregularities with no  flow limiting lesions.  CX:   --  Circumflex: Angiography showed minor luminal irregularities with  no flow limiting lesions.  RCA:   --  Ostial RCA: The distal vessel was supplied by extensive  collaterals from the distal LAD. There was a 100 % stenosis.  COMPLICATIONS: There were no complications.  DIAGNOSTIC IMPRESSIONS: Patient has a dilated LV with severe LV dysfunction  with total occlusion of RCA with mature collaterals with calcification of  aorta with nonobstrictive disease of the left system  DIAGNOSTIC RECOMMENDATIONS: Medical therapy for LV dysfunction.    < end of copied text >    Assessment  87yo Female PMH of A-fib on coumadin, anemia, asthma, CAD, MI, HFrEF 25%, s/p BiVICD, CKD, DM, DVT, HLD, HTN, bilateral cataracts,  chronic glaucoma, PUD  p/w chest pain sudden onset this AM, sharp, midsternal, radiating to back. Now with elevated troponins in the setting of CKD. EKG  V-paced without ST elevations or depressions. CP responding to SL nitro. INR supra therapeutic 3.14       Problem/Plan: R/O ACS  JEREMI score: 5  Patient with elevated CE in the setting of CKD and chest pain responding to SL nitroglycerin. EKG without changes from previous EKG.   Pt currently with supra therapeutic INR and at risk for bleeding. No need for urgent cath at this time. Will reevaluate as necessary. HISTORY OF PRESENT ILLNESS:  Patient is a 86y old  Female who presents with a chief complaint of CP  HPI: 85yo Female PMH of A-fib on coumadin, anemia, asthma, CAD, MI, HFrEF 25%, s/p BiVICD, CKD, DM, DVT, HLD, HTN, bilateral cataracts,  chronic glaucoma, PUD  p/w chest pain sudden onset this AM, sharp, midsternal, radiating to back. Reports CP began at rest. Not associated with SOB nausea, vomiting or diaphoresis. In ED EKG V-paced unchanged from previous EKG in July. CKMB index 3.2 and Troponin 0.55. Given SL nitro with reported improvement in CP. Called for urgent cath consult by Pt's cardiologist Dr Mac. Denies palpitations, syncope, near syncope, numbness, tingling, or weakness.    Allergies    adenosine (Other)  amiodarone (Unknown)  atenolol (Unknown)  digitalis glycosides (Nausea; Vomiting)  Lipitor (Other)    MEDICATIONS:  aspirin  chewable 81 milliGRAM(s) Oral daily  furosemide    Tablet 20 milliGRAM(s) Oral daily  metoprolol succinate ER 25 milliGRAM(s) Oral daily  mexiletine 150 milliGRAM(s) Oral two times a day  ALBUTerol    90 MICROgram(s) HFA Inhaler 1 Puff(s) Inhalation two times a day PRN  fluticasone propionate   110 MICROgram(s) HFA Inhaler 1 Puff(s) Inhalation two times a day  famotidine    Tablet 20 milliGRAM(s) Oral daily  rosuvastatin 40 milliGRAM(s) Oral at bedtime  brimonidine 0.2% Ophthalmic Solution 1 Drop(s) Both EYES daily  latanoprost 0.005% Ophthalmic Solution 1 Drop(s) Both EYES at bedtime      PAST MEDICAL & SURGICAL HISTORY:  Diabetes mellitus  Anemia  Asthma  Old MI (myocardial infarction): IN 1999  ICD: [employer]  DVT (Deep Venous Thrombosis) (ICD9 453.40)  AF (Atrial Fibrillation) (ICD9 427.31): on coumadin  Hyperlipemia (ICD9 272.4)  Chronic Glaucoma (ICD9 365.11)  Congestive Heart Failure (ICD9 428.0)  Bilateral Cataracts (ICD9 366.9)  Chronic Peptic Ulcer without Complication (ICD9 533.70)  Hypertension (ICD9 401.9)  Ventricular Tachycardia (ICD9 427.1)  Osteoporosis (ICD9 733.00)  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)  Coronary Artery Disease (ICD9 414.00)  H/O: hysterectomy  ICD: Medtronic 1999, 2006, 2009  S/P Ablation Operation for Arrhythmia (ICD9 V45.89): Vt ablation  s/p Ablation of Ventricular Arrhythmia (ICD9 V45.89): AVNRT ablation    FAMILY HISTORY:  No significant family history    SOCIAL HISTORY:      Smoker: [ ] Active [x] never  [ ] previous  Alcohol:  [ ] social [x ] daily [ ] never    REVIEW OF SYSTEMS:  CONSTITUTIONAL: No weakness, fevers or chills  EYES/ENT: No visual changes;  No vertigo or throat pain   NECK: No pain or stiffness  RESPIRATORY: No cough, wheezing, hemoptysis; No shortness of breath  CARDIOVASCULAR: Chest pain as per HPI, No palpitations  GASTROINTESTINAL: No abdominal or epigastric pain. No nausea, vomiting, or hematemesis; No diarrhea or constipation. No melena or hematochezia.  GENITOURINARY: No dysuria, frequency or hematuria  NEUROLOGICAL: No numbness or weakness  SKIN: No itching, burning, rashes, or lesions   All other review of systems is negative unless indicated above.    PHYSICAL EXAM:  T(C): 36.9 (11-11-17 @ 14:51), Max: 36.9 (11-11-17 @ 14:51)  HR: 44 (11-11-17 @ 20:46) (44 - 76)  BP: 121/62 (11-11-17 @ 20:46) (116/62 - 136/63)  RR: 16 (11-11-17 @ 20:46) (16 - 19)  SpO2: 98% (11-11-17 @ 20:46) (98% - 100%)  Wt(kg): --    Appearance: Normal	  HEENT:   Normal oral mucosa, PERRL, EOMI	  Lymphatic: No lymphadenopathy  Cardiovascular: Normal S1 S2, No JVD, No murmurs, No edema  Respiratory: Lungs clear to auscultation	  Psychiatry: A & O x 3, Mood & affect appropriate  Gastrointestinal:  Soft, Non-tender, + BS	  Skin: No rashes, No ecchymoses, No cyanosis	  Neurologic: Non-focal, A&Ox3, nonfocal, MEMBRENO x 4  Extremities: Normal range of motion, No clubbing, cyanosis or edema  Vascular: Peripheral pulses palpable 2+ bilaterally    I&O's Summary    	 	  LABS:	 	                          8.7    10.26 )-----------( 197      ( 11 Nov 2017 15:23 )             30.8     11-11    142  |  107  |  20  ----------------------------<  112<H>  4.7   |  24  |  1.85<H>    Ca    8.7      11 Nov 2017 15:23    TPro  6.2  /  Alb  3.4  /  TBili  0.5  /  DBili  x   /  AST  29  /  ALT  19  /  AlkPhos  51  11-11    LIVER FUNCTIONS - ( 11 Nov 2017 15:23 )  Alb: 3.4 g/dL / Pro: 6.2 g/dL / ALK PHOS: 51 u/L / ALT: 19 u/L / AST: 29 u/L / GGT: x             proBNP:   Lipid Profile:   HgA1c:   TSH:   PT/INR - ( 11 Nov 2017 15:23 )   PT: 36.0 SEC;   INR: 3.14          PTT - ( 11 Nov 2017 15:23 )  PTT:31.8 SEC  CARDIAC MARKERS:  Troponin T, Serum: 0.55 ng/mL (11-11 @ 15:23)    Creatine Kinase, Serum: 193 u/L (11-11 @ 15:23)    CKMB: 6.17 ng/mL (11-11 @ 15:23)    CKMB Relative Index: 3.2 (11-11 @ 15:23)    	    ECG: V paced	  RADIOLOGY: < from: CT Angio Chest w/ IV Cont (11.11.17 @ 17:40) >  IMPRESSION:     No aortic dissection, aortic aneurysm, or intramural hematoma.    Reflux of contrast into the suprahepatic IVC and hepatic veins may   reflect right heart failure.    Age-indeterminate T9 and T10 compression fractures are likely   acute/subacute, new since previous CT thoracic spine of 12/29/2015.   Multiple additional chronic compression fractures throughout the thoracic   spine the setting of osteopenia, unchanged.    Moderate hiatal hernia unchanged.      < end of copied text >    	    PREVIOUS DIAGNOSTIC TESTING:    [ ] Echocardiogram: V< from: Transesophageal Echocardiogram (02.12.15 @ 12:27) >  CONCLUSIONS:  1. Mitral annular calcification. Tethered mitral valve  leaflets with normal opening. Mild mitral regurgitation.  2. Calcified trileaflet aortic valve with normal opening.  Mild aortic regurgitation.  3. Severe left atrial enlargement. Decreased MIRTHA function  (velocity 30cm/s). No left atrial or left atrial appendage  thrombus.  4. Severe segmental left ventricular systolic dysfunction.  EF 20-25%. The inferior, infero-lateral and mid to distal  brittnee-lateral walls are akinetic. The rest of the walls  are hypokinetic.  5. Normal right ventricular size and function. A device  wire is noted in the right heart.  6. Estimated right ventricular systolic pressure equals 32  mm Hg, assuming right atrial pressure equals 10 mm Hg,  consistent with normal pulmonary pressures.  7. Normal pericardium with no pericardial effusion.  ------------------------------------------------------------------------    < end of copied text >    [ ]  Catheterization:< from: Cardiac Cath Lab (12.03.13 @ 13:40) >  VENTRICLES: Analysis of regional contractile function demonstrated mild  anterobasal hypokinesis, severe anterolateral hypokinesis, apical  akinesis, diaphragmatic akinesis, and posterobasal akinesis. EF estimated  was 15 %.  CORONARY VESSELS: The coronary circulation is right dominant.  LM:   --  LM: Angiography showed minor luminal irregularities with no flow  limiting lesions.  --  Ostial LM: The vessel was calcified. Angiography showed minor luminal  irregularities with no flow limiting lesions.  LAD:   --  LAD: Angiography showed minor luminal irregularities with no  flow limiting lesions.  CX:   --  Circumflex: Angiography showed minor luminal irregularities with  no flow limiting lesions.  RCA:   --  Ostial RCA: The distal vessel was supplied by extensive  collaterals from the distal LAD. There was a 100 % stenosis.  COMPLICATIONS: There were no complications.  DIAGNOSTIC IMPRESSIONS: Patient has a dilated LV with severe LV dysfunction  with total occlusion of RCA with mature collaterals with calcification of  aorta with nonobstrictive disease of the left system  DIAGNOSTIC RECOMMENDATIONS: Medical therapy for LV dysfunction.    < end of copied text >    Assessment  85yo Female PMH of A-fib on coumadin, anemia, asthma, CAD, MI, HFrEF 25%, s/p BiVICD, CKD, DM, DVT, HLD, HTN, bilateral cataracts,  chronic glaucoma, PUD  p/w chest pain sudden onset this AM, sharp, midsternal, radiating to back. Now with elevated troponins in the setting of CKD. EKG  V-paced without ST elevations or depressions, unchanged from previous EKG. CP responding to SL nitro. INR supra therapeutic 3.14. R/O ASC.       Problem/Plan: R/O ACS  JEREMI score: 5  Discussed with Interventionalist.  No need for urgent cath at this time. Continue care per primary cardiologist.  Will reevaluate as necessary.

## 2017-11-11 NOTE — H&P ADULT - PROBLEM SELECTOR PLAN 1
Admit to telemetry.   Cath consult called- No need for emergent cath at this time.   EKGs prn chest pain. Trend Cardiac enzymes.  Check cbc,tsh,lipid, hemoglobin a1c, bmp with mag and phos.   Heparin drip discontinued due to supratherapeutic INR. Restart heparin if INR is less than 2.5. Occult stool guaiac ordered due to hemoglobin of 8.7.   Continue with aspirin.   Plan discussed with Dr. Mac  f/u MD note

## 2017-11-11 NOTE — ED PROVIDER NOTE - OBJECTIVE STATEMENT
87yo F PMHx of A-fib, anemia, asthma, bilateral cataracts,  chronic glaucoma,PUD , CHF, CAD, DM, DVT, HLD, HTN, ICD, MI, p/w chest pain sudden onset this AM, sharp, radiating to back. began at rest. No associated nausea or vomiting. No numbness, tingling, or weakness. No SOB.

## 2017-11-11 NOTE — H&P ADULT - ATTENDING COMMENTS
Assessment  1. NSTEMI  2. Cardiomyopathy  3. Atrial fibrillation  4. CKD      Plan  1. INR is therapeutic. Will hold IV heparin for now.  2. Aspirin, statins, and beta blockers  3. TTE  4. Possible cardiac catheterization on 11/13    Care plan discussed in detail with patient and patient's .

## 2017-11-11 NOTE — H&P ADULT - PROBLEM SELECTOR PLAN 2
Creatinine at baseline.   Monitor creatinine closely due to receiving IV contrast.   Will hold off fluid hydration for now due to CHF.

## 2017-11-11 NOTE — ED PROVIDER NOTE - PROGRESS NOTE DETAILS
DW w pt and  concern for aortic dissection due to the description of pain, radiation to the back and BP difference in both arms. pt aox3 explained risk of contrast load and risk of aortic dissection. pt verbalized understanding. will discuss w her . pt does not want me to call her kids. states "one is in [place] and the other will tell me its my decision" pt w  at bedside will consent for the ct  w IV contrast. pt understands and verbalized risks of IV dye and renal failure.

## 2017-11-11 NOTE — H&P ADULT - NSHPREVIEWOFSYSTEMS_GEN_ALL_CORE
Constitutional: No fever, fatigue or weight loss.  Skin: No rash.  Eyes: No recent vision problems or eye pain.  ENT: No congestion, ear pain, or sore throat.  Endocrine: No thyroid problems.  Cardiovascular: + chest pain. No palpitation.  Respiratory: No cough, shortness of breath, congestion, or wheezing.  Gastrointestinal: No abdominal pain, nausea, vomiting, or diarrhea.  Genitourinary: No dysuria.  Musculoskeletal: No joint swelling.  Neurologic: No headache.

## 2017-11-11 NOTE — ED ADULT NURSE NOTE - OBJECTIVE STATEMENT
pt received to room #4 with c/o midsternal cp radiating to the back, worse with breathing and movement.  pt Paced on HM, pale in appearance. aox4, skin intact. IV placed, labs drawn and sent. bp obtained in b/l arms and noted.  at bedside. will cont to monitor.

## 2017-11-11 NOTE — PATIENT PROFILE ADULT. - NS TRANSFER PATIENT BELONGINGS
pants, bra, long sleeve, underwear, two pocketbooks,, two necklaces/Jewelry/Money (specify)/Clothing

## 2017-11-11 NOTE — ED ADULT TRIAGE NOTE - CHIEF COMPLAINT QUOTE
Pt c/o midsternal chest pain since this morning, now spreading across entire chest. Denies SOB, denies dizziness, denies nausea. PMH of MI

## 2017-11-11 NOTE — H&P ADULT - NSHPLABSRESULTS_GEN_ALL_CORE
LABS:                        8.7    10.26 )-----------( 197      ( 11 Nov 2017 15:23 )             30.8     11-11    142  |  107  |  20  ----------------------------<  112<H>  4.7   |  24  |  1.85<H>    Ca    8.7      11 Nov 2017 15:23    TPro  6.2  /  Alb  3.4  /  TBili  0.5  /  DBili  x   /  AST  29  /  ALT  19  /  AlkPhos  51  11-11    PT/INR - ( 11 Nov 2017 15:23 )   PT: 36.0 SEC;   INR: 3.14          PTT - ( 11 Nov 2017 15:23 )  PTT:31.8 SEC    CAPILLARY BLOOD GLUCOSE    EKG shows v-paced @ 63 bpm  ms TWI in AVL,V1

## 2017-11-11 NOTE — ED PROVIDER NOTE - PSH
H/O: hysterectomy    ICD  Medtronic 1999, 2006, 2009  s/p Ablation of Ventricular Arrhythmia (ICD9 V45.89)  AVNRT ablation    S/P Ablation Operation for Arrhythmia (ICD9 V45.89)  Vt ablation

## 2017-11-11 NOTE — H&P ADULT - PROBLEM SELECTOR PLAN 4
Patient is euvolemic at this time.   Continue with lasix 20 mg PO QD.   Strict I and Os and daily weights.

## 2017-11-11 NOTE — ED PROVIDER NOTE - ATTENDING CONTRIBUTION TO CARE
Attending Statement: I have personally seen and examined this patient. I have fully participated in the care of this patient. I have reviewed all pertinent clinical information, including history physical exam, plan and the Resident's note and agree except as noted  85yo F hx of atrial fib on coumadin, HTN, HLD, CAD, CHF, hx of DVT, anemia, CKD from home co chest pain. Midsternal chest pain that radiate to the back described as sharp. not associated with sob. no nausea/vomit/diaphoresis or palpitations. no leg swelling or calf tenderness. does not feel like prior MI. no recent travel. no recent illness. no cough. no fever or chills.   Vital signs noted. normal S1-S2 No resp distress. able to speak in full and clear sentences. no wheeze, rales or stridor. +bl femoral pulses. no pedal edema. no calf tenderness. normal pulses bilateral feet. aox3  plan ekg, cxr, labs, tele

## 2017-11-11 NOTE — ED ADULT NURSE REASSESSMENT NOTE - NS ED NURSE REASSESS COMMENT FT1
pt HR noted to be 44, c/o chest pain. pt pale in appearance. cardiology NP and tele PA at bedside. no new orders at this time. will cont to monitor.

## 2017-11-11 NOTE — ED PROVIDER NOTE - MEDICAL DECISION MAKING DETAILS
pt with CP radiating to back concern for dissection, will CTA, pt with chronically elevated Cr, will explain risks benefits to patient. EKG at baseline will r/o ACS. Hx of DVT; no calf pain/tenderness/sob.

## 2017-11-11 NOTE — ED PROVIDER NOTE - PMH
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

## 2017-11-11 NOTE — H&P ADULT - PROBLEM SELECTOR PLAN 3
Routine blood glucose check.   Stop PO medications and start sliding scale.   Check hemoglobin a1c.   Low carb diet

## 2017-11-11 NOTE — H&P ADULT - NSHPPHYSICALEXAM_GEN_ALL_CORE
GENERAL APPEARANCE: Well developed, well nourished, pallor, alert and cooperative, and appears to be in no acute distress.  HEAD: normocephalic.  EYES: PERRL, EOMI.   EARS: External auditory canals clear, hearing grossly intact.  NECK: Neck supple, non-tender without lymphadenopathy, masses or thyromegaly.  CARDIAC: Normal S1 and S2. No S3, S4 or murmurs. Rhythm is irregular.   LUNGS: Clear to auscultation and percussion without rales, rhonchi, wheezing or diminished breath sounds.  ABDOMEN: Positive bowel sounds. Soft, nondistended, nontender. No guarding or rebound. No masses.  EXTREMITIES: No significant deformity or joint abnormality. No edema. Peripheral pulses intact.   NEUROLOGICAL:  Strength and sensation symmetric and intact throughout.   SKIN: Skin normal color, texture and turgor with no lesions or eruptions.  PSYCHIATRIC: The mental examination revealed the patient was oriented to person, place, and time. The patient was able to demonstrate good judgement and reason, without hallucinations, abnormal affect or abnormal behaviors during the examination. Patient is not suicidal.

## 2017-11-12 LAB
APTT BLD: 33.6 SEC — SIGNIFICANT CHANGE UP (ref 27.5–37.4)
BUN SERPL-MCNC: 21 MG/DL — SIGNIFICANT CHANGE UP (ref 7–23)
CALCIUM SERPL-MCNC: 8.5 MG/DL — SIGNIFICANT CHANGE UP (ref 8.4–10.5)
CHLORIDE SERPL-SCNC: 107 MMOL/L — SIGNIFICANT CHANGE UP (ref 98–107)
CHOLEST SERPL-MCNC: 112 MG/DL — LOW (ref 120–199)
CK MB BLD-MCNC: 4.45 NG/ML — SIGNIFICANT CHANGE UP (ref 1–4.7)
CK SERPL-CCNC: 149 U/L — SIGNIFICANT CHANGE UP (ref 25–170)
CO2 SERPL-SCNC: 26 MMOL/L — SIGNIFICANT CHANGE UP (ref 22–31)
CREAT SERPL-MCNC: 1.69 MG/DL — HIGH (ref 0.5–1.3)
GLUCOSE BLDC GLUCOMTR-MCNC: 124 MG/DL — HIGH (ref 70–99)
GLUCOSE BLDC GLUCOMTR-MCNC: 137 MG/DL — HIGH (ref 70–99)
GLUCOSE BLDC GLUCOMTR-MCNC: 69 MG/DL — LOW (ref 70–99)
GLUCOSE BLDC GLUCOMTR-MCNC: 95 MG/DL — SIGNIFICANT CHANGE UP (ref 70–99)
GLUCOSE BLDC GLUCOMTR-MCNC: 99 MG/DL — SIGNIFICANT CHANGE UP (ref 70–99)
GLUCOSE SERPL-MCNC: 71 MG/DL — SIGNIFICANT CHANGE UP (ref 70–99)
HBA1C BLD-MCNC: 6 % — HIGH (ref 4–5.6)
HCT VFR BLD CALC: 28.7 % — LOW (ref 34.5–45)
HDLC SERPL-MCNC: 49 MG/DL — SIGNIFICANT CHANGE UP (ref 45–65)
HGB BLD-MCNC: 8.3 G/DL — LOW (ref 11.5–15.5)
INR BLD: 3.05 — HIGH (ref 0.88–1.17)
LIPID PNL WITH DIRECT LDL SERPL: 47 MG/DL — SIGNIFICANT CHANGE UP
MAGNESIUM SERPL-MCNC: 1.9 MG/DL — SIGNIFICANT CHANGE UP (ref 1.6–2.6)
MCHC RBC-ENTMCNC: 22.5 PG — LOW (ref 27–34)
MCHC RBC-ENTMCNC: 28.9 % — LOW (ref 32–36)
MCV RBC AUTO: 77.8 FL — LOW (ref 80–100)
NRBC # FLD: 0 — SIGNIFICANT CHANGE UP
PHOSPHATE SERPL-MCNC: 3.1 MG/DL — SIGNIFICANT CHANGE UP (ref 2.5–4.5)
PLATELET # BLD AUTO: 175 K/UL — SIGNIFICANT CHANGE UP (ref 150–400)
PMV BLD: SIGNIFICANT CHANGE UP FL (ref 7–13)
POTASSIUM SERPL-MCNC: 4.7 MMOL/L — SIGNIFICANT CHANGE UP (ref 3.5–5.3)
POTASSIUM SERPL-SCNC: 4.7 MMOL/L — SIGNIFICANT CHANGE UP (ref 3.5–5.3)
PROTHROM AB SERPL-ACNC: 35 SEC — HIGH (ref 9.8–13.1)
RBC # BLD: 3.69 M/UL — LOW (ref 3.8–5.2)
RBC # FLD: 21.7 % — HIGH (ref 10.3–14.5)
SODIUM SERPL-SCNC: 141 MMOL/L — SIGNIFICANT CHANGE UP (ref 135–145)
TRIGL SERPL-MCNC: 85 MG/DL — SIGNIFICANT CHANGE UP (ref 10–149)
TROPONIN T SERPL-MCNC: 0.49 NG/ML — HIGH (ref 0–0.06)
TSH SERPL-MCNC: 4.06 UIU/ML — SIGNIFICANT CHANGE UP (ref 0.27–4.2)
WBC # BLD: 8.34 K/UL — SIGNIFICANT CHANGE UP (ref 3.8–10.5)
WBC # FLD AUTO: 8.34 K/UL — SIGNIFICANT CHANGE UP (ref 3.8–10.5)

## 2017-11-12 PROCEDURE — 93010 ELECTROCARDIOGRAM REPORT: CPT

## 2017-11-12 RX ORDER — ACETAMINOPHEN 500 MG
650 TABLET ORAL ONCE
Qty: 0 | Refills: 0 | Status: COMPLETED | OUTPATIENT
Start: 2017-11-12 | End: 2017-11-12

## 2017-11-12 RX ADMIN — Medication 650 MILLIGRAM(S): at 03:37

## 2017-11-12 RX ADMIN — Medication 1 PUFF(S): at 21:17

## 2017-11-12 RX ADMIN — Medication 81 MILLIGRAM(S): at 13:11

## 2017-11-12 RX ADMIN — BRIMONIDINE TARTRATE 1 DROP(S): 2 SOLUTION/ DROPS OPHTHALMIC at 17:27

## 2017-11-12 RX ADMIN — MEXILETINE HYDROCHLORIDE 150 MILLIGRAM(S): 150 CAPSULE ORAL at 17:27

## 2017-11-12 RX ADMIN — Medication 650 MILLIGRAM(S): at 03:07

## 2017-11-12 RX ADMIN — Medication 20 MILLIGRAM(S): at 06:41

## 2017-11-12 RX ADMIN — FAMOTIDINE 20 MILLIGRAM(S): 10 INJECTION INTRAVENOUS at 13:11

## 2017-11-12 RX ADMIN — MEXILETINE HYDROCHLORIDE 150 MILLIGRAM(S): 150 CAPSULE ORAL at 06:00

## 2017-11-12 RX ADMIN — Medication 1 PUFF(S): at 08:55

## 2017-11-12 RX ADMIN — LATANOPROST 1 DROP(S): 0.05 SOLUTION/ DROPS OPHTHALMIC; TOPICAL at 21:18

## 2017-11-12 RX ADMIN — ROSUVASTATIN CALCIUM 40 MILLIGRAM(S): 5 TABLET ORAL at 21:17

## 2017-11-12 RX ADMIN — Medication 25 MILLIGRAM(S): at 08:54

## 2017-11-13 LAB
APTT BLD: 31.5 SEC — SIGNIFICANT CHANGE UP (ref 27.5–37.4)
CK MB BLD-MCNC: 3.08 NG/ML — SIGNIFICANT CHANGE UP (ref 1–4.7)
CK MB BLD-MCNC: SIGNIFICANT CHANGE UP (ref 0–2.5)
CK SERPL-CCNC: 128 U/L — SIGNIFICANT CHANGE UP (ref 25–170)
GLUCOSE BLDC GLUCOMTR-MCNC: 117 MG/DL — HIGH (ref 70–99)
GLUCOSE BLDC GLUCOMTR-MCNC: 120 MG/DL — HIGH (ref 70–99)
GLUCOSE BLDC GLUCOMTR-MCNC: 91 MG/DL — SIGNIFICANT CHANGE UP (ref 70–99)
GLUCOSE BLDC GLUCOMTR-MCNC: 99 MG/DL — SIGNIFICANT CHANGE UP (ref 70–99)
HCT VFR BLD CALC: 31 % — LOW (ref 34.5–45)
HGB BLD-MCNC: 9.1 G/DL — LOW (ref 11.5–15.5)
INR BLD: 2.29 — HIGH (ref 0.88–1.17)
MCHC RBC-ENTMCNC: 22.8 PG — LOW (ref 27–34)
MCHC RBC-ENTMCNC: 29.4 % — LOW (ref 32–36)
MCV RBC AUTO: 77.5 FL — LOW (ref 80–100)
NRBC # FLD: 0 — SIGNIFICANT CHANGE UP
PLATELET # BLD AUTO: 200 K/UL — SIGNIFICANT CHANGE UP (ref 150–400)
PMV BLD: SIGNIFICANT CHANGE UP FL (ref 7–13)
PROTHROM AB SERPL-ACNC: 26.1 SEC — HIGH (ref 9.8–13.1)
RBC # BLD: 4 M/UL — SIGNIFICANT CHANGE UP (ref 3.8–5.2)
RBC # FLD: 21.7 % — HIGH (ref 10.3–14.5)
TROPONIN T SERPL-MCNC: 0.5 NG/ML — HIGH (ref 0–0.06)
WBC # BLD: 9.64 K/UL — SIGNIFICANT CHANGE UP (ref 3.8–10.5)
WBC # FLD AUTO: 9.64 K/UL — SIGNIFICANT CHANGE UP (ref 3.8–10.5)

## 2017-11-13 RX ORDER — HEPARIN SODIUM 5000 [USP'U]/ML
3800 INJECTION INTRAVENOUS; SUBCUTANEOUS EVERY 6 HOURS
Qty: 0 | Refills: 0 | Status: DISCONTINUED | OUTPATIENT
Start: 2017-11-13 | End: 2017-11-14

## 2017-11-13 RX ORDER — HEPARIN SODIUM 5000 [USP'U]/ML
3800 INJECTION INTRAVENOUS; SUBCUTANEOUS ONCE
Qty: 0 | Refills: 0 | Status: COMPLETED | OUTPATIENT
Start: 2017-11-13 | End: 2017-11-13

## 2017-11-13 RX ORDER — HEPARIN SODIUM 5000 [USP'U]/ML
INJECTION INTRAVENOUS; SUBCUTANEOUS
Qty: 25000 | Refills: 0 | Status: DISCONTINUED | OUTPATIENT
Start: 2017-11-13 | End: 2017-11-14

## 2017-11-13 RX ADMIN — Medication 81 MILLIGRAM(S): at 11:02

## 2017-11-13 RX ADMIN — Medication 1 PUFF(S): at 09:35

## 2017-11-13 RX ADMIN — HEPARIN SODIUM 3800 UNIT(S): 5000 INJECTION INTRAVENOUS; SUBCUTANEOUS at 17:57

## 2017-11-13 RX ADMIN — ROSUVASTATIN CALCIUM 40 MILLIGRAM(S): 5 TABLET ORAL at 21:20

## 2017-11-13 RX ADMIN — HEPARIN SODIUM 750 UNIT(S)/HR: 5000 INJECTION INTRAVENOUS; SUBCUTANEOUS at 17:57

## 2017-11-13 RX ADMIN — Medication 20 MILLIGRAM(S): at 05:09

## 2017-11-13 RX ADMIN — MEXILETINE HYDROCHLORIDE 150 MILLIGRAM(S): 150 CAPSULE ORAL at 18:39

## 2017-11-13 RX ADMIN — LATANOPROST 1 DROP(S): 0.05 SOLUTION/ DROPS OPHTHALMIC; TOPICAL at 21:21

## 2017-11-13 RX ADMIN — Medication 25 MILLIGRAM(S): at 05:09

## 2017-11-13 RX ADMIN — Medication 1 PUFF(S): at 21:28

## 2017-11-13 RX ADMIN — MEXILETINE HYDROCHLORIDE 150 MILLIGRAM(S): 150 CAPSULE ORAL at 05:09

## 2017-11-13 RX ADMIN — BRIMONIDINE TARTRATE 1 DROP(S): 2 SOLUTION/ DROPS OPHTHALMIC at 21:21

## 2017-11-13 NOTE — PROGRESS NOTE ADULT - SUBJECTIVE AND OBJECTIVE BOX
Ms. Dial is adryan 86 year old woman with a history of ASHD, prior IWMI with severe LV systolic dysfunction, status post ICD, chronic atrial fibrillation, hyperlipidemia, PVCs, asthma, diabetes mellitus, history of CHF, VT, CRI, admitted with five hours of chest and back pain with associated mild increase in troponin.  Ms. Dial developed severe mid sternal pain radiating across the chest and to her back on Saturday around 9:30 am.  This was exacerbated by turning.  No associated symptoms.  This resolved around 230pm.  Still has some similar although much milder pain with turning.  There is a history of asthma.  History of adenosine induced asthma.      Review Of Systems:  Constitutional: No Fever, Chills,  No Fatigue, No Weight change   HEENT: No Blurred vision, No Headache   Respiratory: No Cough, No sputum production, No Wheezing, No Shortness of breath  Cardiovascular: + Chest Pain, No Palpitations, No Lightheadedness, No Falling, No Syncope, No NIETO, No PND, No Orthopnea, No Peripehral Edema  Gastrointestinal: No Abdominal Pain, No Diarrhea, No Constipation, No Nausea, No Vomiting, Normal Appetite   Genitourinary: No Dysuria  Extremities: No Swelling, No Claudication,   Neurologic:  No Focal deficit, No Weakness, No Dysphagia, No Paresthesias, No Syncope  Skin: No Rash,  No Ecchymoses, No Wounds, No Tenderness, No Drainage     Medications:  ALBUTerol    90 MICROgram(s) HFA Inhaler 1 Puff(s) Inhalation two times a day PRN  aspirin  chewable 81 milliGRAM(s) Oral daily  brimonidine 0.2% Ophthalmic Solution 1 Drop(s) Both EYES daily  dextrose 5%. 1000 milliLiter(s) IV Continuous <Continuous>  dextrose 50% Injectable 12.5 Gram(s) IV Push once  dextrose 50% Injectable 25 Gram(s) IV Push once  dextrose 50% Injectable 25 Gram(s) IV Push once  dextrose Gel 1 Dose(s) Oral once PRN  famotidine    Tablet 20 milliGRAM(s) Oral daily  fluticasone propionate   110 MICROgram(s) HFA Inhaler 1 Puff(s) Inhalation two times a day  furosemide    Tablet 20 milliGRAM(s) Oral daily  glucagon  Injectable 1 milliGRAM(s) IntraMuscular once PRN  influenza   Vaccine 0.5 milliLiter(s) IntraMuscular once  insulin lispro (HumaLOG) corrective regimen sliding scale   SubCutaneous three times a day before meals  insulin lispro (HumaLOG) corrective regimen sliding scale   SubCutaneous at bedtime  latanoprost 0.005% Ophthalmic Solution 1 Drop(s) Both EYES at bedtime  metoprolol succinate ER 25 milliGRAM(s) Oral daily  mexiletine 150 milliGRAM(s) Oral two times a day  rosuvastatin 40 milliGRAM(s) Oral at bedtime    PMH/PSH/FH/SH: Unchanged  Vitals:  T(C): 36.4 (11-13-17 @ 05:07), Max: 36.7 (11-12-17 @ 13:59)  HR: 60 (11-13-17 @ 05:07) (60 - 64)  BP: 132/61 (11-13-17 @ 05:07) (106/48 - 132/61)  RR: 18 (11-13-17 @ 05:07) (18 - 18)  SpO2: 98% (11-13-17 @ 05:07) (98% - 100%)    Physical Exam:  Appearance:  Normal, NAD  Eyes: PERRL, EOMI  HENT:  Normal oral muscosa, NC/AT  Neck: without heptojugular reflux, carotid 2+ equal without bruits  No Thyromegaly  Cardiovascular: normal regular S1, wide split S2,  1/6 systolic ejection murmur   Respiratory: Clear to percussion and auscultation bilaterally  Gastrointestinal: Soft, Non-tender, Non-distended, BS+, No masses  Neurologic: Non-focal, No focal neurologic deficits  Skin: No rashes, No ecchymoses, No cyanosis, no peripheral edema  Pulses-trace equal to dorsalis pedis    11-12    141  |  107  |  21  ----------------------------<  71  4.7   |  26  |  1.69<H>    Ca    8.5      12 Nov 2017 04:17  Phos  3.1     11-12  Mg     1.9     11-12    TPro  6.2  /  Alb  3.4  /  TBili  0.5  /  DBili  x   /  AST  29  /  ALT  19  /  AlkPhos  51  11-11    PT/INR - ( 13 Nov 2017 05:30 )   PT: 26.1 SEC;   INR: 2.29       PTT - ( 13 Nov 2017 05:30 )  PTT:31.5 SEC  CARDIAC MARKERS ( 13 Nov 2017 05:30 )  x     / 0.50 ng/mL / 128 u/L / 3.08 ng/mL / x      CARDIAC MARKERS ( 12 Nov 2017 04:17 )  x     / 0.49 ng/mL / 149 u/L / 4.45 ng/mL / x      CARDIAC MARKERS ( 11 Nov 2017 21:03 )  x     / 0.50 ng/mL / 166 u/L / 5.64 ng/mL / x      CARDIAC MARKERS ( 11 Nov 2017 15:23 )  x     / 0.55 ng/mL / 193 u/L / 6.17 ng/mL / x        Cardiac Testing:  Telemetry: Atrial fibrillation with a ventricular pacemaker escape, PVCs  ECG: In chart- Atrial fibrillation  with ventricular pacemaker escape,

## 2017-11-13 NOTE — PROGRESS NOTE ADULT - ASSESSMENT
Ms. Dial has a severe ischemic cardiomyopathy with a prior inferior wall myocardial infarction with global LV systolic dysfunction.  Last echocardiogram 6/9/16 with LV EF-30-35%, moderate to severe MR, mild AI, RVSP-45.1 mm Hg.  Cardiac Catheterization on 12/3/13 total occlusion of proximal dominant RCA, nonobstructive LAD, Circ, LM with EF-15%.  Now admitted with severe pain and mild elevation of troponin.  The pain is somewhat atypical for myocardial ischemia as it was prolonged and exacerbated by turning or movement of the torso.  The elevation of enzymes likely due to pain with associated hemodynamic changes and resultant natasha-infarct ischemia/infarction.  As low EF and intolerant to adenosine will proceed with cath when INR is under 2.0.  Mucomyst.  Monitor BUN and Cr as she had IV contrast in the last 48 hours.  Continue current medications.  Discontinue furosemide.  Restart rosuvastatin 40 mg daily.

## 2017-11-14 LAB
APTT BLD: 137.2 SEC — CRITICAL HIGH (ref 27.5–37.4)
APTT BLD: 58.8 SEC — HIGH (ref 27.5–37.4)
BUN SERPL-MCNC: 28 MG/DL — HIGH (ref 7–23)
CALCIUM SERPL-MCNC: 8.1 MG/DL — LOW (ref 8.4–10.5)
CHLORIDE SERPL-SCNC: 99 MMOL/L — SIGNIFICANT CHANGE UP (ref 98–107)
CK SERPL-CCNC: 108 U/L — SIGNIFICANT CHANGE UP (ref 25–170)
CO2 SERPL-SCNC: 26 MMOL/L — SIGNIFICANT CHANGE UP (ref 22–31)
CREAT SERPL-MCNC: 2.07 MG/DL — HIGH (ref 0.5–1.3)
GLUCOSE BLDC GLUCOMTR-MCNC: 108 MG/DL — HIGH (ref 70–99)
GLUCOSE BLDC GLUCOMTR-MCNC: 152 MG/DL — HIGH (ref 70–99)
GLUCOSE BLDC GLUCOMTR-MCNC: 77 MG/DL — SIGNIFICANT CHANGE UP (ref 70–99)
GLUCOSE BLDC GLUCOMTR-MCNC: 87 MG/DL — SIGNIFICANT CHANGE UP (ref 70–99)
GLUCOSE BLDC GLUCOMTR-MCNC: 95 MG/DL — SIGNIFICANT CHANGE UP (ref 70–99)
GLUCOSE BLDC GLUCOMTR-MCNC: 99 MG/DL — SIGNIFICANT CHANGE UP (ref 70–99)
GLUCOSE SERPL-MCNC: 88 MG/DL — SIGNIFICANT CHANGE UP (ref 70–99)
HCT VFR BLD CALC: 27.5 % — LOW (ref 34.5–45)
HCT VFR BLD CALC: 27.6 % — LOW (ref 34.5–45)
HGB BLD-MCNC: 8.1 G/DL — LOW (ref 11.5–15.5)
HGB BLD-MCNC: 8.1 G/DL — LOW (ref 11.5–15.5)
INR BLD: 1.85 — HIGH (ref 0.88–1.17)
MAGNESIUM SERPL-MCNC: 1.9 MG/DL — SIGNIFICANT CHANGE UP (ref 1.6–2.6)
MCHC RBC-ENTMCNC: 22.6 PG — LOW (ref 27–34)
MCHC RBC-ENTMCNC: 22.6 PG — LOW (ref 27–34)
MCHC RBC-ENTMCNC: 29.3 % — LOW (ref 32–36)
MCHC RBC-ENTMCNC: 29.5 % — LOW (ref 32–36)
MCV RBC AUTO: 76.6 FL — LOW (ref 80–100)
MCV RBC AUTO: 76.9 FL — LOW (ref 80–100)
NRBC # FLD: 0 — SIGNIFICANT CHANGE UP
NRBC # FLD: 0 — SIGNIFICANT CHANGE UP
PLATELET # BLD AUTO: 165 K/UL — SIGNIFICANT CHANGE UP (ref 150–400)
PLATELET # BLD AUTO: 166 K/UL — SIGNIFICANT CHANGE UP (ref 150–400)
PMV BLD: SIGNIFICANT CHANGE UP FL (ref 7–13)
PMV BLD: SIGNIFICANT CHANGE UP FL (ref 7–13)
POTASSIUM SERPL-MCNC: 3.8 MMOL/L — SIGNIFICANT CHANGE UP (ref 3.5–5.3)
POTASSIUM SERPL-SCNC: 3.8 MMOL/L — SIGNIFICANT CHANGE UP (ref 3.5–5.3)
PROTHROM AB SERPL-ACNC: 21 SEC — HIGH (ref 9.8–13.1)
RBC # BLD: 3.59 M/UL — LOW (ref 3.8–5.2)
RBC # BLD: 3.59 M/UL — LOW (ref 3.8–5.2)
RBC # FLD: 21.6 % — HIGH (ref 10.3–14.5)
RBC # FLD: 21.7 % — HIGH (ref 10.3–14.5)
SODIUM SERPL-SCNC: 139 MMOL/L — SIGNIFICANT CHANGE UP (ref 135–145)
TROPONIN T SERPL-MCNC: 0.44 NG/ML — HIGH (ref 0–0.06)
WBC # BLD: 7.61 K/UL — SIGNIFICANT CHANGE UP (ref 3.8–10.5)
WBC # BLD: 8.28 K/UL — SIGNIFICANT CHANGE UP (ref 3.8–10.5)
WBC # FLD AUTO: 7.61 K/UL — SIGNIFICANT CHANGE UP (ref 3.8–10.5)
WBC # FLD AUTO: 8.28 K/UL — SIGNIFICANT CHANGE UP (ref 3.8–10.5)

## 2017-11-14 PROCEDURE — 93458 L HRT ARTERY/VENTRICLE ANGIO: CPT | Mod: 26,GC

## 2017-11-14 RX ORDER — HEPARIN SODIUM 5000 [USP'U]/ML
2500 INJECTION INTRAVENOUS; SUBCUTANEOUS EVERY 6 HOURS
Qty: 0 | Refills: 0 | Status: DISCONTINUED | OUTPATIENT
Start: 2017-11-14 | End: 2017-11-14

## 2017-11-14 RX ORDER — ACETYLCYSTEINE 200 MG/ML
1200 VIAL (ML) MISCELLANEOUS
Qty: 0 | Refills: 0 | Status: DISCONTINUED | OUTPATIENT
Start: 2017-11-14 | End: 2017-11-15

## 2017-11-14 RX ORDER — HEPARIN SODIUM 5000 [USP'U]/ML
5000 INJECTION INTRAVENOUS; SUBCUTANEOUS EVERY 6 HOURS
Qty: 0 | Refills: 0 | Status: DISCONTINUED | OUTPATIENT
Start: 2017-11-14 | End: 2017-11-14

## 2017-11-14 RX ORDER — WARFARIN SODIUM 2.5 MG/1
5 TABLET ORAL ONCE
Qty: 0 | Refills: 0 | Status: COMPLETED | OUTPATIENT
Start: 2017-11-14 | End: 2017-11-14

## 2017-11-14 RX ORDER — HEPARIN SODIUM 5000 [USP'U]/ML
INJECTION INTRAVENOUS; SUBCUTANEOUS
Qty: 25000 | Refills: 0 | Status: DISCONTINUED | OUTPATIENT
Start: 2017-11-14 | End: 2017-11-14

## 2017-11-14 RX ADMIN — Medication 1: at 18:34

## 2017-11-14 RX ADMIN — MEXILETINE HYDROCHLORIDE 150 MILLIGRAM(S): 150 CAPSULE ORAL at 05:08

## 2017-11-14 RX ADMIN — Medication 1 PUFF(S): at 08:37

## 2017-11-14 RX ADMIN — BRIMONIDINE TARTRATE 1 DROP(S): 2 SOLUTION/ DROPS OPHTHALMIC at 12:50

## 2017-11-14 RX ADMIN — Medication 1200 MILLIGRAM(S): at 09:00

## 2017-11-14 RX ADMIN — MEXILETINE HYDROCHLORIDE 150 MILLIGRAM(S): 150 CAPSULE ORAL at 17:24

## 2017-11-14 RX ADMIN — Medication 1200 MILLIGRAM(S): at 21:35

## 2017-11-14 RX ADMIN — FAMOTIDINE 20 MILLIGRAM(S): 10 INJECTION INTRAVENOUS at 12:50

## 2017-11-14 RX ADMIN — Medication 25 MILLIGRAM(S): at 05:08

## 2017-11-14 RX ADMIN — HEPARIN SODIUM 550 UNIT(S)/HR: 5000 INJECTION INTRAVENOUS; SUBCUTANEOUS at 01:56

## 2017-11-14 RX ADMIN — Medication 1 PUFF(S): at 21:35

## 2017-11-14 RX ADMIN — HEPARIN SODIUM 0 UNIT(S)/HR: 5000 INJECTION INTRAVENOUS; SUBCUTANEOUS at 00:55

## 2017-11-14 RX ADMIN — LATANOPROST 1 DROP(S): 0.05 SOLUTION/ DROPS OPHTHALMIC; TOPICAL at 21:35

## 2017-11-14 RX ADMIN — HEPARIN SODIUM 550 UNIT(S)/HR: 5000 INJECTION INTRAVENOUS; SUBCUTANEOUS at 08:34

## 2017-11-14 RX ADMIN — WARFARIN SODIUM 5 MILLIGRAM(S): 2.5 TABLET ORAL at 17:23

## 2017-11-14 RX ADMIN — Medication 81 MILLIGRAM(S): at 12:01

## 2017-11-14 RX ADMIN — ROSUVASTATIN CALCIUM 40 MILLIGRAM(S): 5 TABLET ORAL at 21:35

## 2017-11-14 NOTE — PROGRESS NOTE ADULT - ASSESSMENT
Ms. Dial has transient right parasternal chest discomfort when turning to her left.  Consistent with musculoskeletal pain.  History of severe LV systolic dysfunction and admitted with minimal elevation of troponin consistent with NonSTEMI.  INR today is 1.85 and acceptable for cardiac catheterization.  Mild increase in BUN and Cr likely due to use of furosemide, last given at 5AM on 11/13/17.  Will proceed with cath of coronary arteries today.  Remain off furosemide.  No significant arrhythmia-remains in atrial fibrillation with pacer escape.  Anemia which is stable.

## 2017-11-14 NOTE — CHART NOTE - NSCHARTNOTEFT_GEN_A_CORE
pt status post cardiac cath via RFA. DRESSING IN PLACE. no bleeding, no swelling, no tenderness. all pulses intact.

## 2017-11-14 NOTE — PROGRESS NOTE ADULT - SUBJECTIVE AND OBJECTIVE BOX
Has right parasternal discomfort when turning to her left.  No discomfort with turning to the right or sitting up.  No other chest discomfort.  No symptoms to suggest myocardial ischemia, cardiac arrhythmia or congestive heart failure.    Review Of Systems:    Constitutional: No Fever, Chills,  No Fatigue, No Weight change   HEENT: No Blurred vision, No Headache   Respiratory: No Cough, No sputum production, No Wheezing, No Shortness of breath  Cardiovascular: + Chest Pain with turning to the left, No Palpitations, No Lightheadedness, No Falling, No Syncope, No NIETO, No PND, No Orthopnea, No Peripehral Edema  Gastrointestinal: No Abdominal Pain, No Diarrhea, No Constipation, No Nausea, No Vomiting, Normal Appetite   Genitourinary: No Dysuria  Extremities: No Swelling, No Claudication,   Neurologic:  No Focal deficit, No Weakness, No Dysphagia, No Paresthesias, No Syncope  Skin: No Rash,  No Ecchymoses, No Wounds, No Tenderness, No Drainage     Medications:  ALBUTerol    90 MICROgram(s) HFA Inhaler 1 Puff(s) Inhalation two times a day PRN  aspirin  chewable 81 milliGRAM(s) Oral daily  brimonidine 0.2% Ophthalmic Solution 1 Drop(s) Both EYES daily  dextrose 5%. 1000 milliLiter(s) IV Continuous <Continuous>  dextrose 50% Injectable 12.5 Gram(s) IV Push once  dextrose 50% Injectable 25 Gram(s) IV Push once  dextrose 50% Injectable 25 Gram(s) IV Push once  dextrose Gel 1 Dose(s) Oral once PRN  famotidine    Tablet 20 milliGRAM(s) Oral daily  fluticasone propionate   110 MICROgram(s) HFA Inhaler 1 Puff(s) Inhalation two times a day  glucagon  Injectable 1 milliGRAM(s) IntraMuscular once PRN  heparin  Infusion.  Unit(s)/Hr IV Continuous <Continuous>  heparin  Injectable 3800 Unit(s) IV Push every 6 hours PRN  influenza   Vaccine 0.5 milliLiter(s) IntraMuscular once  insulin lispro (HumaLOG) corrective regimen sliding scale   SubCutaneous three times a day before meals  insulin lispro (HumaLOG) corrective regimen sliding scale   SubCutaneous at bedtime  latanoprost 0.005% Ophthalmic Solution 1 Drop(s) Both EYES at bedtime  metoprolol succinate ER 25 milliGRAM(s) Oral daily  mexiletine 150 milliGRAM(s) Oral two times a day  rosuvastatin 40 milliGRAM(s) Oral at bedtime    PMH/PSH/FH/SH: Unchanged  Vitals:  T(C): 36.4 (11-14-17 @ 05:07), Max: 36.6 (11-13-17 @ 13:47)  HR: 64 (11-14-17 @ 05:07) (63 - 83)  BP: 106/53 (11-14-17 @ 05:07) (94/57 - 110/56)  RR: 18 (11-14-17 @ 05:07) (18 - 18)  SpO2: 99% (11-14-17 @ 05:07) (97% - 99%)    Physical Exam:  Appearance:  Normal, NAD  Eyes: PERRL, EOMI  HENT:  Normal oral muscosa, NC/AT  Neck: without heptojugular reflux, carotid 2+ equal without bruits  No Thyromegaly  Cardiovascular: normal regular S1, wide split S2,  1/6 holosystolic murmur at apex, S3 gallop at apex  Respiratory: Clear to percussion and auscultation bilaterally  Gastrointestinal: Soft, Non-tender, Non-distended, BS+, No masses  Neurologic: Non-focal, No focal neurologic deficits  Skin: No rashes, No ecchymoses, No cyanosis, no peripheral edema  Pulses-1+ equal to dorsalis pedis    11-14    139  |  99  |  28<H>  ----------------------------<  88  3.8   |  26  |  2.07<H>    Ca    8.1<L>      14 Nov 2017 06:00  Mg     1.9     11-14    PT/INR - ( 14 Nov 2017 06:00 )   PT: 21.0 SEC;   INR: 1.85       PTT - ( 13 Nov 2017 23:50 )  PTT:137.2 SEC  CARDIAC MARKERS ( 14 Nov 2017 06:00 )  x     / 0.44 ng/mL / 108 u/L / x     / x      CARDIAC MARKERS ( 13 Nov 2017 05:30 )  x     / 0.50 ng/mL / 128 u/L / 3.08 ng/mL / x        Cardiac Testing:  Telemetry: atrial fibrillation with ventricular pacemaker escape, PVCs

## 2017-11-14 NOTE — PROVIDER CONTACT NOTE (CRITICAL VALUE NOTIFICATION) - BACKGROUND
Pt's INR is subtherapeutic for cardiac cath procedure and requires heparin drip.  Heparin drip was started earlier this evening at 7.5 mL/hr.

## 2017-11-15 ENCOUNTER — TRANSCRIPTION ENCOUNTER (OUTPATIENT)
Age: 82
End: 2017-11-15

## 2017-11-15 VITALS
DIASTOLIC BLOOD PRESSURE: 60 MMHG | RESPIRATION RATE: 18 BRPM | OXYGEN SATURATION: 95 % | SYSTOLIC BLOOD PRESSURE: 103 MMHG | HEART RATE: 65 BPM | TEMPERATURE: 98 F

## 2017-11-15 LAB
APTT BLD: 25.4 SEC — LOW (ref 27.5–37.4)
BUN SERPL-MCNC: 30 MG/DL — HIGH (ref 7–23)
CALCIUM SERPL-MCNC: 7.8 MG/DL — LOW (ref 8.4–10.5)
CHLORIDE SERPL-SCNC: 98 MMOL/L — SIGNIFICANT CHANGE UP (ref 98–107)
CO2 SERPL-SCNC: 25 MMOL/L — SIGNIFICANT CHANGE UP (ref 22–31)
CREAT SERPL-MCNC: 1.87 MG/DL — HIGH (ref 0.5–1.3)
FERRITIN SERPL-MCNC: 19.9 NG/ML — SIGNIFICANT CHANGE UP (ref 15–150)
FOLATE SERPL-MCNC: 11.5 NG/ML — SIGNIFICANT CHANGE UP (ref 4.7–20)
GLUCOSE BLDC GLUCOMTR-MCNC: 176 MG/DL — HIGH (ref 70–99)
GLUCOSE BLDC GLUCOMTR-MCNC: 69 MG/DL — LOW (ref 70–99)
GLUCOSE SERPL-MCNC: 95 MG/DL — SIGNIFICANT CHANGE UP (ref 70–99)
HAPTOGLOB SERPL-MCNC: 264 MG/DL — HIGH (ref 34–200)
HCT VFR BLD CALC: 29.3 % — LOW (ref 34.5–45)
HGB BLD-MCNC: 8.4 G/DL — LOW (ref 11.5–15.5)
INR BLD: 1.81 — HIGH (ref 0.88–1.17)
IRON SATN MFR SERPL: 271 UG/DL — SIGNIFICANT CHANGE UP (ref 140–530)
IRON SATN MFR SERPL: 28 UG/DL — LOW (ref 30–160)
MCHC RBC-ENTMCNC: 22.2 PG — LOW (ref 27–34)
MCHC RBC-ENTMCNC: 28.7 % — LOW (ref 32–36)
MCV RBC AUTO: 77.5 FL — LOW (ref 80–100)
NRBC # FLD: 0 — SIGNIFICANT CHANGE UP
PLATELET # BLD AUTO: 183 K/UL — SIGNIFICANT CHANGE UP (ref 150–400)
PMV BLD: SIGNIFICANT CHANGE UP FL (ref 7–13)
POTASSIUM SERPL-MCNC: 3.9 MMOL/L — SIGNIFICANT CHANGE UP (ref 3.5–5.3)
POTASSIUM SERPL-SCNC: 3.9 MMOL/L — SIGNIFICANT CHANGE UP (ref 3.5–5.3)
PROTHROM AB SERPL-ACNC: 20.5 SEC — HIGH (ref 9.8–13.1)
RBC # BLD: 3.78 M/UL — LOW (ref 3.8–5.2)
RBC # FLD: 21.8 % — HIGH (ref 10.3–14.5)
RETICS #: 0.1 10X6/UL — HIGH (ref 0.02–0.07)
RETICS/RBC NFR: 1.9 % — SIGNIFICANT CHANGE UP (ref 0.5–2.5)
SODIUM SERPL-SCNC: 143 MMOL/L — SIGNIFICANT CHANGE UP (ref 135–145)
UIBC SERPL-MCNC: 243 UG/DL — SIGNIFICANT CHANGE UP (ref 110–370)
VIT B12 SERPL-MCNC: 202 PG/ML — SIGNIFICANT CHANGE UP (ref 200–900)
WBC # BLD: 8.15 K/UL — SIGNIFICANT CHANGE UP (ref 3.8–10.5)
WBC # FLD AUTO: 8.15 K/UL — SIGNIFICANT CHANGE UP (ref 3.8–10.5)

## 2017-11-15 RX ORDER — WARFARIN SODIUM 2.5 MG/1
5 TABLET ORAL ONCE
Qty: 0 | Refills: 0 | Status: COMPLETED | OUTPATIENT
Start: 2017-11-15 | End: 2017-11-15

## 2017-11-15 RX ORDER — MEXILETINE HYDROCHLORIDE 150 MG/1
1 CAPSULE ORAL
Qty: 0 | Refills: 0 | COMMUNITY
Start: 2017-11-15

## 2017-11-15 RX ORDER — LATANOPROST 0.05 MG/ML
1 SOLUTION/ DROPS OPHTHALMIC; TOPICAL
Qty: 0 | Refills: 0 | COMMUNITY
Start: 2017-11-15

## 2017-11-15 RX ORDER — METOPROLOL TARTRATE 50 MG
1 TABLET ORAL
Qty: 0 | Refills: 0 | COMMUNITY
Start: 2017-11-15

## 2017-11-15 RX ORDER — ROSUVASTATIN CALCIUM 5 MG/1
1 TABLET ORAL
Qty: 0 | Refills: 0 | COMMUNITY
Start: 2017-11-15

## 2017-11-15 RX ORDER — FAMOTIDINE 10 MG/ML
1 INJECTION INTRAVENOUS
Qty: 0 | Refills: 0 | COMMUNITY
Start: 2017-11-15

## 2017-11-15 RX ADMIN — Medication 1200 MILLIGRAM(S): at 05:28

## 2017-11-15 RX ADMIN — WARFARIN SODIUM 5 MILLIGRAM(S): 2.5 TABLET ORAL at 13:31

## 2017-11-15 RX ADMIN — Medication 1 PUFF(S): at 09:17

## 2017-11-15 RX ADMIN — Medication 81 MILLIGRAM(S): at 12:27

## 2017-11-15 RX ADMIN — Medication 1: at 12:27

## 2017-11-15 RX ADMIN — INFLUENZA VIRUS VACCINE 0.5 MILLILITER(S): 15; 15; 15; 15 SUSPENSION INTRAMUSCULAR at 13:31

## 2017-11-15 RX ADMIN — BRIMONIDINE TARTRATE 1 DROP(S): 2 SOLUTION/ DROPS OPHTHALMIC at 12:27

## 2017-11-15 RX ADMIN — MEXILETINE HYDROCHLORIDE 150 MILLIGRAM(S): 150 CAPSULE ORAL at 05:28

## 2017-11-15 RX ADMIN — Medication 25 MILLIGRAM(S): at 05:28

## 2017-11-15 RX ADMIN — FAMOTIDINE 20 MILLIGRAM(S): 10 INJECTION INTRAVENOUS at 12:27

## 2017-11-15 NOTE — DISCHARGE NOTE ADULT - CARE PLAN
Principal Discharge DX:	NSTEMI (non-ST elevated myocardial infarction)  Goal:	Continue to take medications as prescribed. Non-obstructive CAD.  Instructions for follow-up, activity and diet:	Follow up with Dr. Méndez  Secondary Diagnosis:	CKD (chronic kidney disease)  Goal:	Avoid nephrotoxic agents. Cr improving  Secondary Diagnosis:	Diabetes mellitus  Goal:	Monitor FS. Continue home medications.  Instructions for follow-up, activity and diet:	Low sugar diet  Secondary Diagnosis:	CHF (congestive heart failure)  Goal:	Continue medications as prescribed.  Instructions for follow-up, activity and diet:	Follow up with Dr. Méndez.  Secondary Diagnosis:	Hyperlipidemia  Goal:	Continue medications as prescribed.  Instructions for follow-up, activity and diet:	Low cholesterol diet.  Secondary Diagnosis:	Hypertension  Goal:	Monitor blood pressure. Continue medications as prescribed.  Instructions for follow-up, activity and diet:	Low sodium diet.  Secondary Diagnosis:	Atrial fibrillation  Goal:	Continue rate control. Continue coumadin.  Instructions for follow-up, activity and diet:	INR check on Monday in Dr. Méndez's office.

## 2017-11-15 NOTE — PROGRESS NOTE ADULT - ASSESSMENT
Ms. Dial no longer has chest pain with movement of the upper torso.  Cath revealed no significant change in anatomy compared to prior catheterizations.  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  Remains in atrial fibrillation with a well controlled ventricular response.  No significant arrhythmia.  No clinical evidence of CHF.  Cath site clean and dry.  Renal function stable with a slight decline in Cr today-likely due to holding furosemide.  INR subtherapeutic (1.81) and received warfarin 5 mg last night.  Discharge today on all prehospital medications including furosemide 20 mg daily and warfarin.  Continue prior outpatient schedule of warfarin.  INR next Monday.  Limited activities over the next 24 hours due to the cath (arterial stick).  Resume normal activities in 24 hours.

## 2017-11-15 NOTE — DISCHARGE NOTE ADULT - PLAN OF CARE
Continue to take medications as prescribed. Non-obstructive CAD. Follow up with Dr. Méndez Avoid nephrotoxic agents. Cr improving Monitor FS. Continue home medications. Low sugar diet Continue medications as prescribed. Follow up with Dr. Méndez. Low cholesterol diet. Monitor blood pressure. Continue medications as prescribed. Low sodium diet. Continue rate control. Continue coumadin. INR check on Monday in Dr. Méndez's office.

## 2017-11-15 NOTE — DISCHARGE NOTE ADULT - PATIENT PORTAL LINK FT
“You can access the [facility] Patient Portal, offered by [facility], by registering with the following website: [email or web address]”

## 2017-11-15 NOTE — DISCHARGE NOTE ADULT - MEDICATION SUMMARY - MEDICATIONS TO TAKE
I will START or STAY ON the medications listed below when I get home from the hospital:    aspirin 81 mg oral tablet  -- 1 tab(s) by mouth once a day  -- Indication: For CAD    mexiletine 150 mg oral capsule  -- 1 cap(s) by mouth 2 times a day  -- Indication: For Atrial fibrillation    Coumadin 2.5 mg oral tablet  -- 1 tab(s) by mouth once a day  -- Indication: For Anticoagulation for Afib    glipiZIDE 2.5 mg oral tablet, extended release  -- 1 tab(s) by mouth once a day  -- Indication: For Diabetes mellitus    rosuvastatin 40 mg oral tablet  -- 1 tab(s) by mouth once a day (at bedtime)  -- Indication: For Hyperlipidemia    metoprolol succinate 25 mg oral tablet, extended release  -- 1 tab(s) by mouth once a day  -- Indication: For Hypertension    albuterol CFC free 90 mcg/inh inhalation aerosol  -- 1 puff(s) inhaled 2 times a day, As needed, Shortness of Breath  -- Indication: For Asthma    Lasix 20 mg oral tablet  -- 1 tab(s) by mouth once a day  -- Indication: For CHF (congestive heart failure)    famotidine 20 mg oral tablet  -- 1 tab(s) by mouth once a day  -- Indication: For Acid reflux    latanoprost 0.005% ophthalmic solution  -- 1 drop(s) to each affected eye once a day (at bedtime)  -- Indication: For Glaucoma    Alphagan P 0.1% ophthalmic solution  -- 1 drop(s) to each affected eye once a day  -- Indication: For Glaucoma    fluticasone CFC free 110 mcg/inh inhalation aerosol  -- 1 puff(s) inhaled 2 times a day  -- Indication: For Asthma

## 2017-11-15 NOTE — DISCHARGE NOTE ADULT - HOSPITAL COURSE
85 y/o Female, with a PmHx of a-fib on coumadin, anemia, asthma, bilateral cataracts, chronic glaucoma, PUD, CHF (EF 25%) s/p AICD, CAD, DM, DVT, HLD HTN, MI, presents to the ED for chest pain. Pt states the chest pain started this morning. Pt states the chest pain is midsternal, sharp , radiates to the back, nonpleuritic and nonexertional. Pt denies LOC, syncope, fever, chills ,shortness of breath, palpitations, N/V/D/C, sweats, numbness, tingling, dysuria, urinary/bowel incontinence or any other complaints at this time.     In the ED, a cath consult was called and no emergent need for cath is needed at this time. Pt received morphine X 1 and nitroglycerin 0.4 mg X1. Pt is admitted to telemetry.     On Admission, EKG done showed V-paced @ 63 bpm  ms TWI in aVL,V1	. CE x 2 (Trop: 0.55-->0.50; CK: 193-->166; MB: 5.64-->6.17). LHC - ostial LM 10-20%, RCA not injected, LVEDP 14, RFA accessed. Pt is recommended for medical management. Pt comfortable at this time and is medically cleared for discharge home. ONEYDA is improving. INR to be checked on Monday in Dr. Méndez's office. Continue home medications as previously prescribed.

## 2017-11-15 NOTE — DISCHARGE NOTE ADULT - SECONDARY DIAGNOSIS.
CKD (chronic kidney disease) Diabetes mellitus CHF (congestive heart failure) Hyperlipidemia Hypertension Atrial fibrillation

## 2017-11-15 NOTE — PROGRESS NOTE ADULT - SUBJECTIVE AND OBJECTIVE BOX
Feels well.  No longer has chest discomfort with movement of the upper torso.  States no problem with cardiac catheterization yesterday.    Review Of Systems:    Constitutional: No Fever, Chills,  No Fatigue, No Weight change   HEENT: No Blurred vision, No Headache   Respiratory: No Cough, No sputum production, No Wheezing, No Shortness of breath  Cardiovascular: No Chest Pain, No Palpitations, No Lightheadedness, No Falling, No Syncope, No NIETO, No PND, No Orthopnea, No Peripehral Edema  Gastrointestinal: No Abdominal Pain, No Diarrhea, No Constipation, No Nausea, No Vomiting, Normal Appetite   Genitourinary: No Dysuria  Extremities: No Swelling, No Claudication,   Neurologic:  No Focal deficit, No Weakness, No Dysphagia, No Paresthesias, No Syncope  Skin: No Rash,  No Ecchymoses, No Wounds, No Tenderness, No Drainage     Medications:  acetylcysteine  Oral Solution 1200 milliGRAM(s) Oral two times a day  ALBUTerol    90 MICROgram(s) HFA Inhaler 1 Puff(s) Inhalation two times a day PRN  aspirin  chewable 81 milliGRAM(s) Oral daily  brimonidine 0.2% Ophthalmic Solution 1 Drop(s) Both EYES daily  dextrose 5%. 1000 milliLiter(s) IV Continuous <Continuous>  dextrose 50% Injectable 12.5 Gram(s) IV Push once  dextrose 50% Injectable 25 Gram(s) IV Push once  dextrose 50% Injectable 25 Gram(s) IV Push once  dextrose Gel 1 Dose(s) Oral once PRN  famotidine    Tablet 20 milliGRAM(s) Oral daily  fluticasone propionate   110 MICROgram(s) HFA Inhaler 1 Puff(s) Inhalation two times a day  glucagon  Injectable 1 milliGRAM(s) IntraMuscular once PRN  influenza   Vaccine 0.5 milliLiter(s) IntraMuscular once  insulin lispro (HumaLOG) corrective regimen sliding scale   SubCutaneous three times a day before meals  insulin lispro (HumaLOG) corrective regimen sliding scale   SubCutaneous at bedtime  latanoprost 0.005% Ophthalmic Solution 1 Drop(s) Both EYES at bedtime  metoprolol succinate ER 25 milliGRAM(s) Oral daily  mexiletine 150 milliGRAM(s) Oral two times a day  rosuvastatin 40 milliGRAM(s) Oral at bedtime  warfarin 5 milliGRAM(s) Oral once    PMH/PSH/FH/SH: Unchanged  Vitals:  T(C): 36.7 (11-15-17 @ 05:00), Max: 36.7 (11-15-17 @ 05:00)  HR: 60 (11-15-17 @ 05:00) (59 - 61)  BP: 100/55 (11-15-17 @ 05:00) (93/53 - 100/59)  RR: 18 (11-15-17 @ 05:00) (18 - 18)  SpO2: 100% (11-15-17 @ 05:00) (98% - 100%)      Physical Exam:  Appearance:  Normal, NAD  Eyes: PERRL, EOMI  HENT:  Normal oral muscosa, NC/AT  Neck: without heptojugular reflux, carotid 2+ equal without bruits  No Thyromegaly  Cardiovascular: normal  S1, wide split S2,  1/6 holosystolic murmur at apex, 1/6 systolic ejection murmur   Respiratory: Clear to percussion and auscultation bilaterally  Gastrointestinal: Soft, Non-tender, Non-distended, BS+, No masses  Neurologic: Non-focal, No focal neurologic deficits  Skin: No rashes, No ecchymoses, No cyanosis, no peripheral edema  Pulses-trace equal to dorsalis pedis    11-15    143  |  98  |  30<H>  ----------------------------<  95  3.9   |  25  |  1.87<H>    Ca    7.8<L>      15 Nov 2017 07:45  Mg     1.9     11-14    PT/INR - ( 15 Nov 2017 07:45 )   PT: 20.5 SEC;   INR: 1.81       PTT - ( 15 Nov 2017 07:45 )  PTT:25.4 SEC  CARDIAC MARKERS ( 14 Nov 2017 06:00 )  x     / 0.44 ng/mL / 108 u/L / x     / x        Cardiac Testing:  Telemetry: atrial fibrillation with a ventricular pacemaker escape, PVCs    Cardiac catheterization 11/14/17- RCA not injected, 20% ostial left main, no significant obstructive disease in left system

## 2018-02-06 ENCOUNTER — APPOINTMENT (OUTPATIENT)
Dept: ELECTROPHYSIOLOGY | Facility: CLINIC | Age: 83
End: 2018-02-06
Payer: MEDICARE

## 2018-02-06 PROCEDURE — 93296 REM INTERROG EVL PM/IDS: CPT

## 2018-02-06 PROCEDURE — 93295 DEV INTERROG REMOTE 1/2/MLT: CPT

## 2018-05-07 ENCOUNTER — APPOINTMENT (OUTPATIENT)
Dept: ELECTROPHYSIOLOGY | Facility: CLINIC | Age: 83
End: 2018-05-07
Payer: MEDICARE

## 2018-05-07 DIAGNOSIS — I50.9 HEART FAILURE, UNSPECIFIED: ICD-10-CM

## 2018-05-07 PROCEDURE — 93296 REM INTERROG EVL PM/IDS: CPT

## 2018-05-07 PROCEDURE — 93295 DEV INTERROG REMOTE 1/2/MLT: CPT

## 2018-09-06 ENCOUNTER — APPOINTMENT (OUTPATIENT)
Dept: ELECTROPHYSIOLOGY | Facility: CLINIC | Age: 83
End: 2018-09-06

## 2019-08-05 NOTE — DISCHARGE NOTE ADULT - CARE PROVIDER_API CALL
Plan: Start ketoconazole 2% cream apply bid for 2-3 weeks
Detail Level: Zone
Eyal Méndez), Cardiovascular Disease; Internal Medicine  [address] [facility] [address]  [place] [address]  Phone: [phone]  Fax: [phone]    Mara Ellis), Internal Medicine  [address] [place] [address]  Phone: [phone]  Fax: [phone]

## 2025-03-11 NOTE — ED PROVIDER NOTE - NS_EDPROVIDERDISPOUSERTYPE_ED_A_ED
Refer to the Assessment tab to view/cancel completed assessment. Attending Attestation (For Attendings USE Only)...